# Patient Record
Sex: FEMALE | Race: WHITE | Employment: FULL TIME | URBAN - METROPOLITAN AREA
[De-identification: names, ages, dates, MRNs, and addresses within clinical notes are randomized per-mention and may not be internally consistent; named-entity substitution may affect disease eponyms.]

---

## 2017-01-06 ENCOUNTER — OFFICE VISIT (OUTPATIENT)
Dept: PERINATAL CARE | Facility: HOSPITAL | Age: 37
End: 2017-01-06
Attending: OBSTETRICS & GYNECOLOGY
Payer: COMMERCIAL

## 2017-01-06 VITALS
HEIGHT: 70 IN | SYSTOLIC BLOOD PRESSURE: 133 MMHG | BODY MASS INDEX: 30.06 KG/M2 | HEART RATE: 93 BPM | DIASTOLIC BLOOD PRESSURE: 73 MMHG | WEIGHT: 210 LBS

## 2017-01-06 DIAGNOSIS — O44.42 LOW-LYING PLACENTA WITHOUT HEMORRHAGE, SECOND TRIMESTER: ICD-10-CM

## 2017-01-06 DIAGNOSIS — O09.522 AMA (ADVANCED MATERNAL AGE) MULTIGRAVIDA 35+, SECOND TRIMESTER: Primary | ICD-10-CM

## 2017-01-06 PROBLEM — O09.529 AMA (ADVANCED MATERNAL AGE) MULTIGRAVIDA 35+: Status: ACTIVE | Noted: 2017-01-06

## 2017-01-06 PROBLEM — O09.529 AMA (ADVANCED MATERNAL AGE) MULTIGRAVIDA 35+ (HCC): Status: ACTIVE | Noted: 2017-01-06

## 2017-01-06 PROCEDURE — 99242 OFF/OP CONSLTJ NEW/EST SF 20: CPT

## 2017-01-06 PROCEDURE — 76817 TRANSVAGINAL US OBSTETRIC: CPT

## 2017-01-06 RX ORDER — CHOLECALCIFEROL (VITAMIN D3) 25 MCG
1 TABLET,CHEWABLE ORAL DAILY
COMMUNITY
End: 2018-12-27 | Stop reason: ALTCHOICE

## 2017-01-06 NOTE — PROGRESS NOTES
Outpatient Maternal-Fetal Medicine Consultation    Dear Dr. Gwendolyn Gaucher    Thank you for requesting ultrasound evaluation and maternal fetal medicine consultation on your patient Domenica Maynard.   As you are aware she is a 29year old female  with a Singl internal loss.   ____________________________________________________________________________  Dating:  LMP: 08/18/2016 EDC: 05/25/2017 GA by LMP: 20w1d  Current Scan on: 01/06/2017 EDC: 05/26/2017 GA by current scan: 20w0d  The calculation of the gestation mm.  ____________________________________________________________________________      I interpreted the results and reviewed them with the patient.     DISCUSSION  During her visit we discussed and reviewed the following issues:  ADVANCED MATERNAL AGE    B non-invasive pregnancy testing (NIPT) offers the highest detection rate (with the lowest false positive rate) for the detection of fetal aneuploidy amongst high-risk patients.   The limitations of detailed mid-trimester sonography was reviewed with the derrick physician face-to-face time was 30 minutes.

## 2017-03-03 ENCOUNTER — NURSE ONLY (OUTPATIENT)
Dept: ENDOCRINOLOGY CLINIC | Facility: CLINIC | Age: 37
End: 2017-03-03

## 2017-03-03 VITALS
SYSTOLIC BLOOD PRESSURE: 131 MMHG | HEART RATE: 102 BPM | DIASTOLIC BLOOD PRESSURE: 82 MMHG | WEIGHT: 223 LBS | HEIGHT: 70 IN | BODY MASS INDEX: 31.92 KG/M2

## 2017-03-03 DIAGNOSIS — O24.410 DIET CONTROLLED GESTATIONAL DIABETES MELLITUS (GDM) IN THIRD TRIMESTER: Primary | ICD-10-CM

## 2017-03-03 PROCEDURE — G0108 DIAB MANAGE TRN  PER INDIV: HCPCS

## 2017-03-03 RX ORDER — LANCETS 33 GAUGE
1 EACH MISCELLANEOUS 4 TIMES DAILY
Qty: 2 BOX | Refills: 1 | Status: SHIPPED | OUTPATIENT
Start: 2017-03-03 | End: 2017-05-08

## 2017-03-03 NOTE — PROGRESS NOTES
Hannahindira Ileana  :11/10/1980 was seen for Gestational Diabetes Counseling: Individual/Group    Date: 3/3/2017       Assessment:/82 mmHg  Pulse 102  Ht 70\"  Wt 223 lb  BMI 32.00 kg/m2  LMP 2016    : 2  Para: 1  JAILENE: 2017  History of Diabetes and Pregnancy: A guide to self management  BG Log Sheets    Recommendations:      1. Follow recommended meal plan. 2. Begin testing fasting glucose and 2 hour after meals   3. Bring glucose / food log to next MD office visit.    4. Encouraged act

## 2017-03-23 ENCOUNTER — NURSE ONLY (OUTPATIENT)
Dept: ENDOCRINOLOGY CLINIC | Facility: CLINIC | Age: 37
End: 2017-03-23

## 2017-03-23 VITALS
BODY MASS INDEX: 32 KG/M2 | DIASTOLIC BLOOD PRESSURE: 88 MMHG | WEIGHT: 223 LBS | HEART RATE: 104 BPM | SYSTOLIC BLOOD PRESSURE: 139 MMHG

## 2017-03-23 DIAGNOSIS — O24.410 DIET CONTROLLED GESTATIONAL DIABETES MELLITUS (GDM) IN THIRD TRIMESTER: Primary | ICD-10-CM

## 2017-03-23 PROCEDURE — G0108 DIAB MANAGE TRN  PER INDIV: HCPCS

## 2017-03-23 NOTE — PROGRESS NOTES
Met with Trevor Smalls for a 2 week Gestational follow up. She is testing her BG levels fasting and 2 hours after every meal.  With an average of 99 fasting and 87 2 hours after meals. She is following her diet and is exercising for 1 hour a day.   She does not  v

## 2017-05-08 RX ORDER — LANCETS 33 GAUGE
1 EACH MISCELLANEOUS 4 TIMES DAILY
Qty: 2 BOX | Refills: 1 | Status: SHIPPED | OUTPATIENT
Start: 2017-05-08 | End: 2018-05-08

## 2017-05-25 ENCOUNTER — TELEPHONE (OUTPATIENT)
Dept: OBGYN UNIT | Facility: HOSPITAL | Age: 37
End: 2017-05-25

## 2017-05-26 ENCOUNTER — TELEPHONE (OUTPATIENT)
Dept: OBGYN UNIT | Facility: HOSPITAL | Age: 37
End: 2017-05-26

## 2017-05-30 ENCOUNTER — HOSPITAL ENCOUNTER (INPATIENT)
Facility: HOSPITAL | Age: 37
LOS: 2 days | Discharge: HOME | End: 2017-06-01
Attending: OBSTETRICS & GYNECOLOGY | Admitting: OBSTETRICS & GYNECOLOGY
Payer: COMMERCIAL

## 2017-05-30 PROBLEM — Z34.90 NORMAL PREGNANCY (HCC): Status: ACTIVE | Noted: 2017-05-30

## 2017-05-30 PROBLEM — Z34.90 PREGNANCY: Status: ACTIVE | Noted: 2017-05-30

## 2017-05-30 PROBLEM — Z34.90 NORMAL PREGNANCY: Status: ACTIVE | Noted: 2017-05-30

## 2017-05-30 PROBLEM — Z34.90 PREGNANCY (HCC): Status: ACTIVE | Noted: 2017-05-30

## 2017-05-30 PROCEDURE — 85027 COMPLETE CBC AUTOMATED: CPT | Performed by: OBSTETRICS & GYNECOLOGY

## 2017-05-30 PROCEDURE — 86850 RBC ANTIBODY SCREEN: CPT | Performed by: OBSTETRICS & GYNECOLOGY

## 2017-05-30 PROCEDURE — 86900 BLOOD TYPING SEROLOGIC ABO: CPT | Performed by: OBSTETRICS & GYNECOLOGY

## 2017-05-30 PROCEDURE — 86901 BLOOD TYPING SEROLOGIC RH(D): CPT | Performed by: OBSTETRICS & GYNECOLOGY

## 2017-05-30 PROCEDURE — 0KQM0ZZ REPAIR PERINEUM MUSCLE, OPEN APPROACH: ICD-10-PCS | Performed by: OBSTETRICS & GYNECOLOGY

## 2017-05-30 PROCEDURE — 86780 TREPONEMA PALLIDUM: CPT | Performed by: OBSTETRICS & GYNECOLOGY

## 2017-05-30 PROCEDURE — 82962 GLUCOSE BLOOD TEST: CPT

## 2017-05-30 RX ORDER — ACETAMINOPHEN 325 MG/1
650 TABLET ORAL EVERY 4 HOURS PRN
Status: DISCONTINUED | OUTPATIENT
Start: 2017-05-30 | End: 2017-06-01

## 2017-05-30 RX ORDER — TERBUTALINE SULFATE 1 MG/ML
0.25 INJECTION, SOLUTION SUBCUTANEOUS AS NEEDED
Status: DISCONTINUED | OUTPATIENT
Start: 2017-05-30 | End: 2017-05-30 | Stop reason: HOSPADM

## 2017-05-30 RX ORDER — HYDROCODONE BITARTRATE AND ACETAMINOPHEN 5; 325 MG/1; MG/1
2 TABLET ORAL EVERY 4 HOURS PRN
Status: DISCONTINUED | OUTPATIENT
Start: 2017-05-30 | End: 2017-06-01

## 2017-05-30 RX ORDER — LABETALOL HYDROCHLORIDE 5 MG/ML
INJECTION, SOLUTION INTRAVENOUS
Status: COMPLETED
Start: 2017-05-30 | End: 2017-05-30

## 2017-05-30 RX ORDER — ZOLPIDEM TARTRATE 5 MG/1
5 TABLET ORAL NIGHTLY PRN
Status: DISCONTINUED | OUTPATIENT
Start: 2017-05-30 | End: 2017-06-01

## 2017-05-30 RX ORDER — NALBUPHINE HCL 10 MG/ML
2.5 AMPUL (ML) INJECTION
Status: DISCONTINUED | OUTPATIENT
Start: 2017-05-30 | End: 2017-06-01

## 2017-05-30 RX ORDER — SIMETHICONE 80 MG
80 TABLET,CHEWABLE ORAL 3 TIMES DAILY PRN
Status: DISCONTINUED | OUTPATIENT
Start: 2017-05-30 | End: 2017-06-01

## 2017-05-30 RX ORDER — HYDROCODONE BITARTRATE AND ACETAMINOPHEN 5; 325 MG/1; MG/1
1 TABLET ORAL EVERY 4 HOURS PRN
Status: DISCONTINUED | OUTPATIENT
Start: 2017-05-30 | End: 2017-06-01

## 2017-05-30 RX ORDER — BISACODYL 10 MG
10 SUPPOSITORY, RECTAL RECTAL ONCE AS NEEDED
Status: ACTIVE | OUTPATIENT
Start: 2017-05-30 | End: 2017-05-30

## 2017-05-30 RX ORDER — IBUPROFEN 600 MG/1
600 TABLET ORAL EVERY 6 HOURS
Status: DISCONTINUED | OUTPATIENT
Start: 2017-05-30 | End: 2017-06-01

## 2017-05-30 RX ORDER — HYDRALAZINE HYDROCHLORIDE 20 MG/ML
10 INJECTION INTRAMUSCULAR; INTRAVENOUS ONCE AS NEEDED
Status: DISCONTINUED | OUTPATIENT
Start: 2017-06-02 | End: 2017-06-01

## 2017-05-30 RX ORDER — EPHEDRINE SULFATE 50 MG/ML
5 INJECTION, SOLUTION INTRAVENOUS AS NEEDED
Status: DISCONTINUED | OUTPATIENT
Start: 2017-05-30 | End: 2017-06-01

## 2017-05-30 RX ORDER — DEXTROSE, SODIUM CHLORIDE, SODIUM LACTATE, POTASSIUM CHLORIDE, AND CALCIUM CHLORIDE 5; .6; .31; .03; .02 G/100ML; G/100ML; G/100ML; G/100ML; G/100ML
INJECTION, SOLUTION INTRAVENOUS AS NEEDED
Status: DISCONTINUED | OUTPATIENT
Start: 2017-05-30 | End: 2017-05-30 | Stop reason: HOSPADM

## 2017-05-30 RX ORDER — DOCUSATE SODIUM 100 MG/1
100 CAPSULE, LIQUID FILLED ORAL
Status: DISCONTINUED | OUTPATIENT
Start: 2017-05-30 | End: 2017-06-01

## 2017-05-30 RX ORDER — LABETALOL HYDROCHLORIDE 5 MG/ML
80 INJECTION, SOLUTION INTRAVENOUS ONCE AS NEEDED
Status: DISCONTINUED | OUTPATIENT
Start: 2017-06-01 | End: 2017-06-01

## 2017-05-30 RX ORDER — LABETALOL HYDROCHLORIDE 5 MG/ML
20 INJECTION, SOLUTION INTRAVENOUS ONCE AS NEEDED
Status: COMPLETED | OUTPATIENT
Start: 2017-05-30 | End: 2017-05-30

## 2017-05-30 RX ORDER — LABETALOL HYDROCHLORIDE 5 MG/ML
40 INJECTION, SOLUTION INTRAVENOUS ONCE AS NEEDED
Status: ACTIVE | OUTPATIENT
Start: 2017-05-31 | End: 2017-05-31

## 2017-05-30 RX ORDER — SODIUM CHLORIDE, SODIUM LACTATE, POTASSIUM CHLORIDE, CALCIUM CHLORIDE 600; 310; 30; 20 MG/100ML; MG/100ML; MG/100ML; MG/100ML
INJECTION, SOLUTION INTRAVENOUS CONTINUOUS
Status: DISCONTINUED | OUTPATIENT
Start: 2017-05-30 | End: 2017-05-30 | Stop reason: HOSPADM

## 2017-05-30 RX ORDER — IBUPROFEN 600 MG/1
600 TABLET ORAL ONCE AS NEEDED
Status: DISCONTINUED | OUTPATIENT
Start: 2017-05-30 | End: 2017-05-30 | Stop reason: HOSPADM

## 2017-05-30 NOTE — PROGRESS NOTES
Sign out received - patient comfortable   Tracing reviewed - fetal heart tones gradually creeping up since 07:00 - No decelerations or fever. Ephedrine given two hour ago after epidural at 05:30 causing decrease in BP   (BP stable now).     Suspect early c

## 2017-05-30 NOTE — PLAN OF CARE
Problem: Patient/Family Goals  Goal: Patient/Family Long Term Goal  Patient’s Long Term Goal: patient will have successful vaginal delivery    Interventions:  - See additional Care Plan goals for specific interventions   Outcome: Completed Date Met:  05/30 pain management  - Manage/alleviate anxiety  - Utilize distraction and/or relaxation techniques  - Monitor for opioid side effects  - Notify MD/LIP if interventions unsuccessful or patient reports new pain   Outcome: Completed Date Met:  05/30/17    Proble

## 2017-05-30 NOTE — L&D DELIVERY NOTE
Vaginal Delivery Note          Keyanna Garcia Patient Status:  Inpatient    11/10/1980 MRN [id number]   [facility] 1NW-A Attending Elisha Underwood MD   Hosp Day # 0 [facility] [address] condition.   Mother's Information           Raji Middleton  [[id number]]     Labor Events     steroids?:  None   Antibiotics received during labor?:  Yes   Antibiotics (enter # doses in comment):  ampicillin   Rupture date:  17  Rupture time:  0100 Spontaneous   Appearance:  Intact   Disposition:  held for future pathology          Apgars    Living status:  Yes   Apgar Scoring Key:    0 1 2    Skin color Blue or pale Acrocyanotic Completely pink    Heart rate Absent <100 bpm >100 bpm    Reflex irrita

## 2017-05-30 NOTE — PROGRESS NOTES
Patient up to bathroom with assist x 2. Voided 700. Patient transferred to mother/baby room 2213 per wheelchair in stable condition with baby and personal belongings. Accompanied by significant other and staff. Report given to mother/baby RN.

## 2017-05-30 NOTE — PROGRESS NOTES
Patient to triage 3 with complaints of contractions every 5 minutes since 0100. Patient denies vaginal bleeding. Possible ROM @ 0100. Unable to confirm d/t patient be uncomfortable with contractions. SVE completed and patient moved to room 113.

## 2017-05-30 NOTE — H&P
Mary Thompson Patient Status:  Inpatient    11/10/1980 MRN [id number]   [facility] 1NW-A Attending Carlton Rose MD   Hosp Day # 0 PCP Maxine Bhatia MD     Subjective:  Gurwinder Olson is a 29 yea and post admission procedures and expectations were discussed in detail. All questions answered, all appropriate consents will be signed at the Hospital. Admission is planned for today. Expectant management., Anticipate vaginal delivery. , Analgesia: ROMAIN

## 2017-05-30 NOTE — PROGRESS NOTES
NURSING ADMISSION NOTE      Patient admitted via wheelchair  Oriented to room. Safety precautions initiated. Bed in low position. Call light in reach. Instructed to call for assistance before ambulating for the 1st time.   RN reviewed admit packet w

## 2017-05-31 PROCEDURE — 85025 COMPLETE CBC W/AUTO DIFF WBC: CPT | Performed by: OBSTETRICS & GYNECOLOGY

## 2017-05-31 NOTE — PLAN OF CARE
POSTPARTUM    • Long Term Goal:Experiences normal postpartum course Progressing    • Optimize infant feeding at the breast; patient alternating breasts while feeding  Progressing    • Establishment of adequate milk supply with medication/procedure interrup

## 2017-06-01 VITALS
DIASTOLIC BLOOD PRESSURE: 96 MMHG | SYSTOLIC BLOOD PRESSURE: 142 MMHG | TEMPERATURE: 98 F | HEIGHT: 70 IN | RESPIRATION RATE: 17 BRPM | BODY MASS INDEX: 32.21 KG/M2 | WEIGHT: 225 LBS | HEART RATE: 76 BPM | OXYGEN SATURATION: 97 %

## 2017-06-01 RX ORDER — HYDROCODONE BITARTRATE AND ACETAMINOPHEN 5; 325 MG/1; MG/1
1 TABLET ORAL EVERY 4 HOURS PRN
Qty: 20 TABLET | Refills: 0 | Status: SHIPPED | OUTPATIENT
Start: 2017-06-01 | End: 2018-12-27 | Stop reason: ALTCHOICE

## 2017-06-01 NOTE — CONSULTS
[facility]    Patients Name: Darlene Edwards  Attending Physician: Amy Mccrary MD  CSN: [id number]    Location:  2213/2213-A  MRN: [id number]    YOB: 1980  Admission Date: 5/30/2017     Obstetric Anesthesia Pain Progress Note    Post-Op

## 2017-06-01 NOTE — PLAN OF CARE
POSTPARTUM    • Long Term Goal:Experiences normal postpartum course Completed    • Optimize infant feeding at the breast Completed    • Establishment of adequate milk supply with medication/procedure interruptions Completed    • Appropriate maternal - newb

## 2017-06-01 NOTE — PROGRESS NOTES
REVIEWED D/C TEACHING WITH PT. VERBALIZES UNDERSTANDING. ALL QUESTIONS ANSWERED. PT STATES FEELS CONFIDENT CARING FOR SELF AND  AT HOME. MOM & BABY DISCHARGED TO HOME IN STABLE CONDITION. WILL FOLLOW-UP IN OFFICE AS DIRECTED WITH PEDS AND OB.

## 2017-06-01 NOTE — PROGRESS NOTES
[facility]  Post-Partum Progress Note    John Ards Patient Status:  Inpatient    11/10/1980 MRN [id number]   [facility] 2SW-J Attending Mervin Martin MD   Hosp Day # 2 PCP Tanya Fitzpatrick MD     SUBJECTIVE:    Postpartum Day

## 2017-06-01 NOTE — DISCHARGE SUMMARY
[facility]  Discharge Summary    Nasrin Lopez Patient Status:  Inpatient    11/10/1980 MRN [id number]   [facility] 2SW-J Attending Carmen Lomeli MD   Hosp Day # 2 PCP Benjamin Chapin MD         [facility]: Estimated Date of Delivery: 5

## 2017-06-04 ENCOUNTER — TELEPHONE (OUTPATIENT)
Dept: OBGYN UNIT | Facility: HOSPITAL | Age: 37
End: 2017-06-04

## 2017-06-04 NOTE — PROGRESS NOTES
Outgoing cradle call completed. Mom reports that she and infant are doing well. No complaints of PPB or PPD. Has had pediatrician F/U visit. Has PP F/U visit scheduled. Reviewed basic infant and self care; verbalizes understanding.   Encouraged to follow-u

## 2018-03-06 NOTE — PROGRESS NOTES
[facility]  Post-Partum Progress Note    Altonida Han Patient Status:  Inpatient    11/10/1980 MRN [id number]   [facility] 2SW-J Attending Matteo Barillas MD   Hosp Day # 1 PCP Stephanie See MD     SUBJECTIVE:    Postpartum Day Svetlana Narvaez MD

## 2018-10-20 ENCOUNTER — IMMUNIZATION (OUTPATIENT)
Dept: FAMILY MEDICINE CLINIC | Facility: CLINIC | Age: 38
End: 2018-10-20
Payer: COMMERCIAL

## 2018-10-20 DIAGNOSIS — Z23 NEED FOR VACCINATION: ICD-10-CM

## 2018-10-20 PROCEDURE — 90471 IMMUNIZATION ADMIN: CPT | Performed by: NURSE PRACTITIONER

## 2018-10-20 PROCEDURE — 90686 IIV4 VACC NO PRSV 0.5 ML IM: CPT | Performed by: NURSE PRACTITIONER

## 2018-10-22 ENCOUNTER — MED REC SCAN ONLY (OUTPATIENT)
Dept: FAMILY MEDICINE CLINIC | Facility: CLINIC | Age: 38
End: 2018-10-22

## 2018-11-01 PROCEDURE — 88175 CYTOPATH C/V AUTO FLUID REDO: CPT | Performed by: OBSTETRICS & GYNECOLOGY

## 2018-11-01 PROCEDURE — 87624 HPV HI-RISK TYP POOLED RSLT: CPT | Performed by: OBSTETRICS & GYNECOLOGY

## 2018-12-27 ENCOUNTER — OFFICE VISIT (OUTPATIENT)
Dept: FAMILY MEDICINE CLINIC | Facility: CLINIC | Age: 38
End: 2018-12-27
Payer: COMMERCIAL

## 2018-12-27 VITALS
DIASTOLIC BLOOD PRESSURE: 66 MMHG | TEMPERATURE: 98 F | SYSTOLIC BLOOD PRESSURE: 122 MMHG | WEIGHT: 220 LBS | BODY MASS INDEX: 32.58 KG/M2 | RESPIRATION RATE: 16 BRPM | HEART RATE: 74 BPM | HEIGHT: 69 IN

## 2018-12-27 DIAGNOSIS — J06.9 VIRAL URI WITH COUGH: Primary | ICD-10-CM

## 2018-12-27 PROCEDURE — 99213 OFFICE O/P EST LOW 20 MIN: CPT | Performed by: FAMILY MEDICINE

## 2018-12-27 RX ORDER — BENZONATATE 200 MG/1
200 CAPSULE ORAL 3 TIMES DAILY PRN
Qty: 30 CAPSULE | Refills: 0 | Status: SHIPPED | OUTPATIENT
Start: 2018-12-27 | End: 2019-01-06

## 2018-12-27 NOTE — PROGRESS NOTES
Patient presents with:  Chest Congestion: dry cough and productive, gets worse in the the evening, no fevers, no ear pain       HPI:   Obdulio Gasca is a 41year old female who presents for upper respiratory symptoms    Mothers symptoms started over the wee exertion or rest.  CARDIOVASCULAR: denies chest pain on exertion  GI: no nausea or abdominal pain      EXAM:   /66   Pulse 74   Temp 98.4 °F (36.9 °C) (Oral)   Resp 16   Ht 69\"   Wt 220 lb   LMP 11/30/2018 (Approximate)   BMI 32.49 kg/m²   GENERAL: 35+     Low-lying placenta without hemorrhage, second trimester     Pregnancy     Normal pregnancy      (normal spontaneous vaginal delivery)      Jennifer Osorio MD  2018  11:14 AM

## 2018-12-30 ENCOUNTER — OFFICE VISIT (OUTPATIENT)
Dept: FAMILY MEDICINE CLINIC | Facility: CLINIC | Age: 38
End: 2018-12-30
Payer: COMMERCIAL

## 2018-12-30 VITALS
WEIGHT: 220 LBS | OXYGEN SATURATION: 99 % | BODY MASS INDEX: 32.58 KG/M2 | HEART RATE: 80 BPM | RESPIRATION RATE: 16 BRPM | SYSTOLIC BLOOD PRESSURE: 124 MMHG | DIASTOLIC BLOOD PRESSURE: 72 MMHG | HEIGHT: 69 IN | TEMPERATURE: 98 F

## 2018-12-30 DIAGNOSIS — H66.001 NON-RECURRENT ACUTE SUPPURATIVE OTITIS MEDIA OF RIGHT EAR WITHOUT SPONTANEOUS RUPTURE OF TYMPANIC MEMBRANE: Primary | ICD-10-CM

## 2018-12-30 DIAGNOSIS — H61.21 IMPACTED CERUMEN OF RIGHT EAR: ICD-10-CM

## 2018-12-30 PROCEDURE — 99213 OFFICE O/P EST LOW 20 MIN: CPT | Performed by: NURSE PRACTITIONER

## 2018-12-30 PROCEDURE — 69210 REMOVE IMPACTED EAR WAX UNI: CPT | Performed by: NURSE PRACTITIONER

## 2018-12-30 RX ORDER — AMOXICILLIN AND CLAVULANATE POTASSIUM 875; 125 MG/1; MG/1
1 TABLET, FILM COATED ORAL 2 TIMES DAILY
Qty: 20 TABLET | Refills: 0 | Status: SHIPPED | OUTPATIENT
Start: 2018-12-30 | End: 2019-01-09

## 2018-12-30 NOTE — PATIENT INSTRUCTIONS
1. Rest. Drink plenty of fluids. 2. Augmentin as prescribed. 3. Tylenol/Ibuprofen for pain/fevers. 4. Use humidifier at home when possible.   5. Follow up with PMD in 3-4 days for reeval. Follow up sooner or go to the emergency department immediately if Reviewed: 6/1/2016  © 0474-6191 [employer] [address] [facility] [place], [facility] [place]. All rights reserved. This information is not intended as a substitute for professional medical care.  Always follow your healthcare professional's instruct swimming plugs, and swim molds can cause the same problem when used again and again. In some cases, the cause of impacted earwax is not known.   Symptoms of impacted earwax  Excess earwax usually does not cause any symptoms, unless there is a large amount include bleeding or severe ear pain. Date Last Reviewed: 5/1/2017  © 0046-9053 [employer]. [address] [facility] [place], [facility] [place]. All rights reserved. This information is not intended as a substitute for professional medical care.  Cherelle Crum

## 2018-12-30 NOTE — PROGRESS NOTES
CHIEF COMPLAINT:   Patient presents with:  Ear Pain      HPI:   Laure Newman is a non-toxic, well appearing 41year old female who presents today with complaints of right ear fullness. Notes it has been present for 3-4 days. Denies fevers.  Notes muffled SKIN: no rashes,no suspicious lesions  HEAD: atraumatic, normocephalic  EYES: conjunctiva clear, EOM intact  EARS: Right ear: Initial assessment shows ear canal completely occluded with cerumen.   Unable to visualize TM Pt ear irrigated and curette used to Treatment options discussed with patient and explained in detail. Will start augmentin today along with symptomatic careThe risks, benefits and potential side effects of the medications were reviewed. Alternatives were discussed.  Advised probiotic while ta Follow up with your healthcare provider, or as advised, in 2 weeks if all symptoms have not gotten better, or if hearing doesn't go back to normal within 1 month.   When to seek medical advice  Call your healthcare provider right away if any of these occur: · Bony blockage in the ear (osteoma or exostoses)  · Infections, such as  infection of the outer ear (external otitis)  · Skin disease, such as eczema  · Autoimmune diseases, such as lupus  · A narrowed ear canal from birth, chronic inflammation, or injury Healthcare providers do not advise use of ear candles or ear vacuum kits. These methods are not shown to work and may cause problems.    Preventing impacted earwax  You may not be able to prevent impacted earwax if you have a health condition that causes it

## 2018-12-30 NOTE — PROCEDURES
Cerumen Impaction    Reason(s) for procedure:  Non-recurrent acute suppurative otitis media of right ear without spontaneous rupture of tympanic membrane  (primary encounter diagnosis)  Impacted cerumen of right ear    Ear Exam:  Canal:  Right ear canal co

## 2019-11-11 ENCOUNTER — IMMUNIZATION (OUTPATIENT)
Dept: FAMILY MEDICINE CLINIC | Facility: CLINIC | Age: 39
End: 2019-11-11
Payer: COMMERCIAL

## 2019-11-11 DIAGNOSIS — Z23 NEED FOR VACCINATION: ICD-10-CM

## 2019-11-11 PROCEDURE — 90471 IMMUNIZATION ADMIN: CPT | Performed by: NURSE PRACTITIONER

## 2019-11-11 PROCEDURE — 90686 IIV4 VACC NO PRSV 0.5 ML IM: CPT | Performed by: NURSE PRACTITIONER

## 2020-10-19 NOTE — PROGRESS NOTES
Dr Barb Gusman notified of blood pressures of 154/83 and 148/73. No new orders. In the next few weeks you may receive a [employer] survey regarding your most recent clinic visit with us.  Please take a few moments to accurately evaluate your visit. We strive to provide you with the best medical care. Your feedback will assist us in achieving this. Again, thank you for your time and we look forward to your next visit.         If we need to contact you regarding any test results, we will make 2 attempts to reach you at the number you have listed during your office visit today. If we are unable to reach you, a letter with your results and any further instructions will be mailed to you home.      [facility]  [phone] [address] [place] [address]

## 2022-03-08 NOTE — PLAN OF CARE
If you have any questions during regular office hours, please contact the nurse line at [phone]  If acute urgent concerns arise after hours, you can call [phone] and ask to speak to the pediatric gastroenterologist on call.  If you have clinic scheduling needs, please call the Call Center at [phone].  If you need to schedule Radiology tests, call [phone].  Outside lab and imaging results should be faxed to [phone]. If you go to a lab outside of [place] we will not automatically get those results. You will need to ask them to send them to us.  My Chart messages are for routine communication and questions and are usually answered within 48-72 hours. If you have an urgent concern or require sooner response, please call us.  Main  Services:  [phone]  ? Hmong/Pashto/Ren: [phone]  ? [place]: [phone]  ? Citizen of [place]: [phone]  ?    POSTPARTUM    • Long Term Goal:Experiences normal postpartum course Progressing    • Optimize infant feeding at the breast Progressing    • Establishment of adequate milk supply with medication/procedure interruptions Progressing    • Experiences normal br

## 2022-03-11 ENCOUNTER — APPOINTMENT (OUTPATIENT)
Dept: GENERAL RADIOLOGY | Age: 42
End: 2022-03-11
Attending: PHYSICIAN ASSISTANT
Payer: COMMERCIAL

## 2022-03-11 ENCOUNTER — TELEPHONE (OUTPATIENT)
Dept: ORTHOPEDICS CLINIC | Facility: CLINIC | Age: 42
End: 2022-03-11

## 2022-03-11 ENCOUNTER — HOSPITAL ENCOUNTER (EMERGENCY)
Age: 42
Discharge: HOME | End: 2022-03-11
Attending: EMERGENCY MEDICINE
Payer: COMMERCIAL

## 2022-03-11 VITALS
HEART RATE: 84 BPM | TEMPERATURE: 98 F | BODY MASS INDEX: 32.93 KG/M2 | HEIGHT: 70 IN | WEIGHT: 230 LBS | DIASTOLIC BLOOD PRESSURE: 104 MMHG | OXYGEN SATURATION: 100 % | SYSTOLIC BLOOD PRESSURE: 157 MMHG | RESPIRATION RATE: 16 BRPM

## 2022-03-11 DIAGNOSIS — S93.401A MODERATE RIGHT ANKLE SPRAIN, INITIAL ENCOUNTER: Primary | ICD-10-CM

## 2022-03-11 PROCEDURE — 99283 EMERGENCY DEPT VISIT LOW MDM: CPT

## 2022-03-11 PROCEDURE — 73610 X-RAY EXAM OF ANKLE: CPT | Performed by: PHYSICIAN ASSISTANT

## 2022-03-11 NOTE — TELEPHONE ENCOUNTER
Patient has an appointment scheduled but would like something sooner. She is scheduled on March 28. Please advise patient if we can get her in any sooner.

## 2022-03-11 NOTE — ED INITIAL ASSESSMENT (HPI)
Pt states she fell and twisted her right ankle on Sunday, Pt has been I walking boot with crutches.  Pt sates she still has pain and swelling

## 2022-03-11 NOTE — TELEPHONE ENCOUNTER
Patient had a sprained right  ankle and would like to get in as soon as possible.  Please advise patient i

## 2022-03-11 NOTE — TELEPHONE ENCOUNTER
Spoke with patient and scheduled her to see Dr. Mary Wyman on Monday, 3/14. Appt set and date/time/location confirmed with patient. Pt verbalized understanding. No further questions at this time. Xrays in epic.

## 2022-03-14 ENCOUNTER — OFFICE VISIT (OUTPATIENT)
Dept: ORTHOPEDICS CLINIC | Facility: CLINIC | Age: 42
End: 2022-03-14
Payer: COMMERCIAL

## 2022-03-14 DIAGNOSIS — S93.491A SPRAIN OF ANTERIOR TALOFIBULAR LIGAMENT OF RIGHT ANKLE, INITIAL ENCOUNTER: Primary | ICD-10-CM

## 2022-03-14 PROCEDURE — 99204 OFFICE O/P NEW MOD 45 MIN: CPT | Performed by: ORTHOPAEDIC SURGERY

## 2023-01-03 ENCOUNTER — OFFICE VISIT (OUTPATIENT)
Dept: FAMILY MEDICINE CLINIC | Facility: CLINIC | Age: 43
End: 2023-01-03
Payer: COMMERCIAL

## 2023-01-03 VITALS
WEIGHT: 220 LBS | HEART RATE: 84 BPM | DIASTOLIC BLOOD PRESSURE: 88 MMHG | HEIGHT: 70 IN | TEMPERATURE: 98 F | OXYGEN SATURATION: 98 % | RESPIRATION RATE: 18 BRPM | SYSTOLIC BLOOD PRESSURE: 128 MMHG | BODY MASS INDEX: 31.5 KG/M2

## 2023-01-03 DIAGNOSIS — J02.9 SORE THROAT: ICD-10-CM

## 2023-01-03 DIAGNOSIS — J02.0 STREPTOCOCCAL PHARYNGITIS: Primary | ICD-10-CM

## 2023-01-03 LAB
CONTROL LINE PRESENT WITH A CLEAR BACKGROUND (YES/NO): YES YES/NO
STREP GRP A CUL-SCR: POSITIVE

## 2023-01-03 PROCEDURE — 3074F SYST BP LT 130 MM HG: CPT | Performed by: FAMILY MEDICINE

## 2023-01-03 PROCEDURE — 3008F BODY MASS INDEX DOCD: CPT | Performed by: FAMILY MEDICINE

## 2023-01-03 PROCEDURE — 87880 STREP A ASSAY W/OPTIC: CPT | Performed by: FAMILY MEDICINE

## 2023-01-03 PROCEDURE — 99213 OFFICE O/P EST LOW 20 MIN: CPT | Performed by: FAMILY MEDICINE

## 2023-01-03 PROCEDURE — 3079F DIAST BP 80-89 MM HG: CPT | Performed by: FAMILY MEDICINE

## 2023-01-03 RX ORDER — AMOXICILLIN 500 MG/1
500 CAPSULE ORAL 2 TIMES DAILY
Qty: 20 CAPSULE | Refills: 0 | Status: SHIPPED | OUTPATIENT
Start: 2023-01-03 | End: 2023-01-13

## 2024-08-23 RX ORDER — ETONOGESTREL AND ETHINYL ESTRADIOL VAGINAL RING .015; .12 MG/D; MG/D
RING VAGINAL
Qty: 1 EACH | Refills: 11 | Status: CANCELLED
Start: 2024-08-23

## 2024-08-26 ENCOUNTER — OFFICE VISIT (OUTPATIENT)
Facility: CLINIC | Age: 44
End: 2024-08-26
Payer: COMMERCIAL

## 2024-08-26 VITALS
DIASTOLIC BLOOD PRESSURE: 86 MMHG | HEIGHT: 70 IN | WEIGHT: 233 LBS | BODY MASS INDEX: 33.36 KG/M2 | SYSTOLIC BLOOD PRESSURE: 138 MMHG

## 2024-08-26 DIAGNOSIS — Z00.00 ANNUAL PHYSICAL EXAM: ICD-10-CM

## 2024-08-26 DIAGNOSIS — N39.3 SUI (STRESS URINARY INCONTINENCE, FEMALE): ICD-10-CM

## 2024-08-26 DIAGNOSIS — Z13.220 SCREENING FOR HYPERLIPIDEMIA: ICD-10-CM

## 2024-08-26 DIAGNOSIS — Z12.4 SCREENING FOR MALIGNANT NEOPLASM OF CERVIX: ICD-10-CM

## 2024-08-26 DIAGNOSIS — N36.41 URETHRAL HYPERMOBILITY: ICD-10-CM

## 2024-08-26 DIAGNOSIS — N92.0 MENORRHAGIA WITH REGULAR CYCLE: ICD-10-CM

## 2024-08-26 DIAGNOSIS — Z01.419 ENCOUNTER FOR WELL WOMAN EXAM WITH ROUTINE GYNECOLOGICAL EXAM: Primary | ICD-10-CM

## 2024-08-26 DIAGNOSIS — E66.9 OBESITY (BMI 30.0-34.9): ICD-10-CM

## 2024-08-26 DIAGNOSIS — Z12.31 SCREENING MAMMOGRAM FOR BREAST CANCER: ICD-10-CM

## 2024-08-26 DIAGNOSIS — Z80.41 FAMILY HISTORY OF OVARIAN CANCER: ICD-10-CM

## 2024-08-26 PROBLEM — Z34.90 PREGNANCY (HCC): Status: RESOLVED | Noted: 2017-05-30 | Resolved: 2024-08-26

## 2024-08-26 PROBLEM — O09.529 AMA (ADVANCED MATERNAL AGE) MULTIGRAVIDA 35+ (HCC): Status: RESOLVED | Noted: 2017-01-06 | Resolved: 2024-08-26

## 2024-08-26 PROBLEM — E66.811 OBESITY (BMI 30.0-34.9): Status: ACTIVE | Noted: 2024-08-26

## 2024-08-26 PROBLEM — O44.42: Status: RESOLVED | Noted: 2017-01-06 | Resolved: 2024-08-26

## 2024-08-26 PROBLEM — Z34.90 NORMAL PREGNANCY (HCC): Status: RESOLVED | Noted: 2017-05-30 | Resolved: 2024-08-26

## 2024-08-26 LAB
APPEARANCE: CLEAR
BILIRUBIN: NEGATIVE
GLUCOSE (URINE DIPSTICK): NEGATIVE MG/DL
KETONES (URINE DIPSTICK): NEGATIVE MG/DL
LEUKOCYTES: NEGATIVE
MULTISTIX LOT#: NORMAL NUMERIC
NITRITE, URINE: NEGATIVE
OCCULT BLOOD: NEGATIVE
PH, URINE: 5.5 (ref 4.5–8)
PROTEIN (URINE DIPSTICK): NEGATIVE MG/DL
SPECIFIC GRAVITY: 1.03 (ref 1–1.03)
URINE-COLOR: YELLOW
UROBILINOGEN,SEMI-QN: 1 MG/DL (ref 0–1.9)

## 2024-08-26 PROCEDURE — 87624 HPV HI-RISK TYP POOLED RSLT: CPT | Performed by: OBSTETRICS & GYNECOLOGY

## 2024-08-26 PROCEDURE — 87086 URINE CULTURE/COLONY COUNT: CPT | Performed by: OBSTETRICS & GYNECOLOGY

## 2024-08-26 NOTE — PROGRESS NOTES
GYN H&P     Genetic questionnaire reviewed with the patient and she will be referred for genetic counseling if the questionnaire had any positive results.    The [facility] intake form was also reviewed regarding contraception, menstrual periods, urinary health, and vaginal / sexual health    2024  1:17 PM    Chief Complaint   Patient presents with    Annual       HPI: Va is a 43 year old  Patient's last menstrual period was 2024.  (contraception: Vasectomy) here for her annual gyn exam.     She has no complaints.   Menses are regular. Denies any pelvic or breast complaints.   Satisfied with current contraception.     Hx of H-IUD use    F. Hx of ovarian cancer    Hx of very large babies - and KYMBERLY    Menses heavier - 5-7 days, 2 - 3 days heavy changing every 2 - 3 hours    Previous encounters and chart reviewed.   2018  4:15 PM     Patient presents with:  Physical: C/O DARK MOLE RIGHT LABIA -RAISED & GROWING X 4 MONTHS.        HPI: Va is a 37 year old  Patient's last menstrual period was 2018.  (contraception: vasectomy/OCs) here for her annual gyn exam.      She has complaint of a vulvar mole that has increased in size and darkened. Menses are regular. Denies  or breast complaints.  Is not satisfied with current contraception.   Does note significant family hx of ovarian Ca.  OB History    Para Term  AB Living   3 2 2 0 1 2   SAB IAB Ectopic Multiple Live Births   1 0 0   2      # Outcome Date GA Lbr Justice/2nd Weight Sex Type Anes PTL Lv   3 Term 17 40w5d 06:48 / 01:59 9 lb 10.9 oz (4.39 kg) M NORMAL SPONT EPI  CHRISTIAN      Complications: Variable decelerations   2 Term 10/30/10 39w0d  9 lb 5 oz (4.224 kg) M Vag-Spont   CHRISTIAN   1 2009     SAB          GYN hx:   Menarche: 15  Period Cycle (Days): 28 days  Period Duration (Days): 6 days  Period Flow: mild  Use of Birth Control (if yes, specify type): Vasectomy  Hx Prior Abnormal Pap: No  Pap Date:  11/01/18  Pap Result Notes: neg,neg 11/2018. neg/neg  (01/2012 NEG, 12/10/2010 neg, 11/12/2009 neg )  Follow Up Recommendation: today      Past Medical History:    Gestational diabetes (HCC)    diet controlled     Past Surgical History:   Procedure Laterality Date    D & c  11/2009    MAB    Other surgical history  12/29/2010    mirena iud inserted    Other surgical history  08/102015    mirena iud removed     No Known Allergies  No current outpatient medications on file prior to visit.     No current facility-administered medications on file prior to visit.     Family History   Problem Relation Age of Onset    Ovarian Cancer Maternal Grandmother 65        APPROX AGE    Ovarian Cancer Other 50        LATE 50'S     Heart Disorder Maternal Grandfather      Social History     Socioeconomic History    Marital status:      Spouse name: Not on file    Number of children: Not on file    Years of education: Not on file    Highest education level: Not on file   Occupational History    Not on file   Tobacco Use    Smoking status: Never     Passive exposure: Never    Smokeless tobacco: Never   Vaping Use    Vaping status: Never Used   Substance and Sexual Activity    Alcohol use: Yes     Alcohol/week: 1.0 standard drink of alcohol     Types: 1 Glasses of wine per week    Drug use: No    Sexual activity: Yes     Partners: Male     Birth control/protection: Vasectomy   Other Topics Concern    Not on file   Social History Narrative    Not on file     Social Determinants of Health     Financial Resource Strain: Not on file   Food Insecurity: Not on file   Transportation Needs: Not on file   Physical Activity: Not on file   Stress: Not on file   Social Connections: Not on file   Housing Stability: Not on file       ROS:     Review of Systems:  General: denies fevers, chills, fatigue and malaise.   Eyes: no visual changes, denies headaches  ENT: no complaints, denies earaches, runny nose, epistaxis, throat pain or sore  throat  Respiratory: denies SOB, dyspnea, cough or wheezing  Cardiovascular: denies chest pain, palpitations, exercise intolerance   GI: denies abdominal pain, diarrhea, constipation  : no complaints, denies dysuria, increased urinary frequency. KYMBERLY see HPI   Menses regular, no dysmenorrhea, no menorrhagia, no dyspareunia   Hematological/lymphatic: denies history of excessive bleeding or bruising, denies dizziness, lightheadedness.   Breast: denies rashes, skin changes, pain, lumps or discharge   Psychiatric: denies depression, changes in sleep patterns, anxiety  Endocrine: denies hot or cold intolerance, mood changes   Neurological: denies changes in sight, smell, hearing or taste. Denies seizures or tremors  Immunological: denies allergies, denies anaphylaxis, or swollen lymph nodes  Musculoskeletal: denies joint pain, morning stiffness, decreased range of motion         O /86   Ht 70\"   Wt 233 lb (105.7 kg)   LMP 08/01/2024   BMI 33.43 kg/m²         Wt Readings from Last 6 Encounters:   08/26/24 233 lb (105.7 kg)   01/03/23 220 lb (99.8 kg)   03/11/22 230 lb (104.3 kg)   12/30/18 220 lb (99.8 kg)   12/27/18 220 lb (99.8 kg)   11/01/18 214 lb (97.1 kg)     Exam:   GENERAL: well developed, well nourished, in no apparent distress, oriented.  SKIN: no rashes, no suspicious lesions  HEENT: normal  NECK: supple; no thyromegaly, no adenopathy  LUNGS: clear to auscultation  CARDIOVASCULAR: normal S1, S2, RRR  BREASTS: soft, nontender, no palpable masses or nodes, no nipple discharge, no skin changes, no axillary adenopathy  ABDOMEN: no scars,  soft, non distended; non tender, no masses  PELVIC: External Genitalia: Normal appearing, no lesions.    Vagina: normal pink mucosa, no lesions, normal clear discharge.    First degree  anterior or posterior hernias with urethral laxity    Cervix: multiparous, no lesions , No CMT     Uterus: very very well supported AVAF, mobile, non tender, normal size    Adnexa: non  tender, no masses, normal size    Rectal: deferred  EXTREMITIES:  non tender without edema  Component  Ref Range & Units  1:56 PM   Glucose Urine  Negative mg/dL Negative   Bilirubin Urine  Negative Negative   Ketones, UA  Negative - Trace mg/dL Negative   Spec Gravity  1.005 - 1.030 1.030   Blood Urine  Negative Negative   PH Urine  5.0 - 8.0 5.5   Protein Urine  Negative - Trace mg/dL Negative   Urobilinogen Urine  0.2 - 1.0 mg/dL 1.0   Nitrite Urine  Negative Negative   Leukocyte Esterase Urine  Negative Negative   APPEARANCE  Clear Clear   Color Urine  Yellow Yellow   Multistix Lot#  Numeric [id number]   Multistix Expiration Date  Date 12/31/2024         A/P: Patient is 43 year old female with no complaints. Here for well woman exam.     Patient counseled on:    Diet/exercise.      Self Breast Exams     Safe sex practices / and living environment     Vaccines:  Annual Flu, Tdap +/- Gardasil  recommended (up to 45 yrs).      Pneumococcal at 65 yrs old, Shingles at 60 yrs old          Pap: done  GC/Chlamydia:  na  Mammogram:  ordered  Dexa: na  Colonoscopy / Cologuard:   na  Lipid / Cholesterol:             Meds This Visit:    Requested Prescriptions      No prescriptions requested or ordered in this encounter       1. Encounter for well woman exam with routine gynecological exam    2. Screening for malignant neoplasm of cervix  - ThinPrep PAP with HPV Reflex Request B; Future    3. Screening mammogram for breast cancer  - [facility] ELISSA 2D+3D SCREENING BILAT (CPT=77067/31494); Future    4. Annual physical exam    5. Family history of ovarian cancer    6. Obesity (BMI 30.0-34.9)    7. KYMBERLY (stress urinary incontinence, female) - minor - doesn't wear a pad - knows how to do Kegels       Discussed pelvic floor P T and TVT sling.  - Urine Dip in office [92920]    8. Menorrhagia with regular cycle  - Urine Dip in office [[id number]]  - CBC With Differential With Platelet; Future  - TSH W Reflex To Free T4; Future    9. Screening for  hyperlipidemia  - Fasting Glucose; Future  - LIPID PANEL W LDL/HDL RATIO; Future    10. Urethral hypermobility    UA and C&S  Incontinence Questionnaire - consistent with KYMBERLY -   Pelvic floor PT discussed    Ultrasound and EMbx for menorrhagia along with CBC and TSH  Wt reduction helps  F hx of Ov. CA - discussed salpingectomy benefits but see genetic counselor first.     Wt reduction, offered Dr. Santos services, going to PCP who also specializes in wt reduction.     Return in about 2 weeks (around 9/9/2024) for ultrasound, Embx.    Manjinder Galloway MD   8/26/2024  1:17 PM       This note was created by Dragon voice recognition. Errors in content may be related to improper recognition by the system; efforts to review and correct have been done but errors may still exist. Please contact me with any questions.

## 2024-08-27 ENCOUNTER — HOSPITAL ENCOUNTER (OUTPATIENT)
Dept: MAMMOGRAPHY | Age: 44
Discharge: HOME | End: 2024-08-27
Attending: OBSTETRICS & GYNECOLOGY
Payer: COMMERCIAL

## 2024-08-27 DIAGNOSIS — Z12.31 SCREENING MAMMOGRAM FOR BREAST CANCER: ICD-10-CM

## 2024-08-27 PROCEDURE — 77063 BREAST TOMOSYNTHESIS BI: CPT | Performed by: OBSTETRICS & GYNECOLOGY

## 2024-08-27 PROCEDURE — 77067 SCR MAMMO BI INCL CAD: CPT | Performed by: OBSTETRICS & GYNECOLOGY

## 2024-08-30 LAB
.: NORMAL
.: NORMAL

## 2024-09-03 ENCOUNTER — LAB ENCOUNTER (OUTPATIENT)
Dept: LAB | Age: 44
End: 2024-09-03
Attending: OBSTETRICS & GYNECOLOGY
Payer: COMMERCIAL

## 2024-09-03 DIAGNOSIS — Z13.220 SCREENING FOR HYPERLIPIDEMIA: Primary | ICD-10-CM

## 2024-09-03 DIAGNOSIS — N92.0 MENORRHAGIA WITH REGULAR CYCLE: ICD-10-CM

## 2024-09-03 LAB
BASOPHILS # BLD AUTO: 0.04 X10(3) UL (ref 0–0.2)
BASOPHILS NFR BLD AUTO: 0.8 %
CHOLEST SERPL-MCNC: 187 MG/DL (ref ?–200)
EOSINOPHIL # BLD AUTO: 0.11 X10(3) UL (ref 0–0.7)
EOSINOPHIL NFR BLD AUTO: 2.1 %
ERYTHROCYTE [DISTWIDTH] IN BLOOD BY AUTOMATED COUNT: 13.1 %
FASTING PATIENT GLUCOSE ANSWER: YES
FASTING PATIENT LIPID ANSWER: YES
GLUCOSE BLD-MCNC: 111 MG/DL (ref 70–99)
HCT VFR BLD AUTO: 42.6 %
HDLC SERPL-MCNC: 46 MG/DL (ref 40–59)
HGB BLD-MCNC: 14 G/DL
HPV E6+E7 MRNA CVX QL NAA+PROBE: NEGATIVE
IMM GRANULOCYTES # BLD AUTO: 0.01 X10(3) UL (ref 0–1)
IMM GRANULOCYTES NFR BLD: 0.2 %
LDLC SERPL CALC-MCNC: 122 MG/DL (ref ?–100)
LYMPHOCYTES # BLD AUTO: 1.91 X10(3) UL (ref 1–4)
LYMPHOCYTES NFR BLD AUTO: 36.2 %
MCH RBC QN AUTO: 28.7 PG (ref 26–34)
MCHC RBC AUTO-ENTMCNC: 32.9 G/DL (ref 31–37)
MCV RBC AUTO: 87.3 FL
MONOCYTES # BLD AUTO: 0.43 X10(3) UL (ref 0.1–1)
MONOCYTES NFR BLD AUTO: 8.2 %
NEUTROPHILS # BLD AUTO: 2.77 X10 (3) UL (ref 1.5–7.7)
NEUTROPHILS # BLD AUTO: 2.77 X10(3) UL (ref 1.5–7.7)
NEUTROPHILS NFR BLD AUTO: 52.5 %
NONHDLC SERPL-MCNC: 141 MG/DL (ref ?–130)
PLATELET # BLD AUTO: 267 10(3)UL (ref 150–450)
RBC # BLD AUTO: 4.88 X10(6)UL
TRIGL SERPL-MCNC: 107 MG/DL (ref 30–149)
TSI SER-ACNC: 2.98 MIU/ML (ref 0.55–4.78)
VLDLC SERPL CALC-MCNC: 19 MG/DL (ref 0–30)
WBC # BLD AUTO: 5.3 X10(3) UL (ref 4–11)

## 2024-09-03 PROCEDURE — 85025 COMPLETE CBC W/AUTO DIFF WBC: CPT | Performed by: OBSTETRICS & GYNECOLOGY

## 2024-09-03 PROCEDURE — 84443 ASSAY THYROID STIM HORMONE: CPT | Performed by: OBSTETRICS & GYNECOLOGY

## 2024-09-03 PROCEDURE — 82947 ASSAY GLUCOSE BLOOD QUANT: CPT | Performed by: OBSTETRICS & GYNECOLOGY

## 2024-09-03 PROCEDURE — 80061 LIPID PANEL: CPT | Performed by: OBSTETRICS & GYNECOLOGY

## 2024-09-10 ENCOUNTER — MED REC SCAN ONLY (OUTPATIENT)
Facility: CLINIC | Age: 44
End: 2024-09-10

## 2024-10-29 ENCOUNTER — OFFICE VISIT (OUTPATIENT)
Dept: INTERNAL MEDICINE CLINIC | Facility: CLINIC | Age: 44
End: 2024-10-29
Payer: COMMERCIAL

## 2024-10-29 ENCOUNTER — LAB ENCOUNTER (OUTPATIENT)
Dept: LAB | Age: 44
End: 2024-10-29
Attending: INTERNAL MEDICINE
Payer: COMMERCIAL

## 2024-10-29 VITALS
RESPIRATION RATE: 18 BRPM | HEART RATE: 84 BPM | DIASTOLIC BLOOD PRESSURE: 74 MMHG | BODY MASS INDEX: 34.07 KG/M2 | SYSTOLIC BLOOD PRESSURE: 128 MMHG | HEIGHT: 69 IN | WEIGHT: 230 LBS | OXYGEN SATURATION: 99 %

## 2024-10-29 DIAGNOSIS — E78.5 DYSLIPIDEMIA: ICD-10-CM

## 2024-10-29 DIAGNOSIS — R73.01 IFG (IMPAIRED FASTING GLUCOSE): ICD-10-CM

## 2024-10-29 DIAGNOSIS — E66.811 OBESITY (BMI 30.0-34.9): ICD-10-CM

## 2024-10-29 DIAGNOSIS — Z51.81 THERAPEUTIC DRUG MONITORING: ICD-10-CM

## 2024-10-29 DIAGNOSIS — E66.811 OBESITY (BMI 30.0-34.9): Primary | ICD-10-CM

## 2024-10-29 DIAGNOSIS — Z86.32 HISTORY OF GESTATIONAL DIABETES: ICD-10-CM

## 2024-10-29 LAB
EST. AVERAGE GLUCOSE BLD GHB EST-MCNC: 111 MG/DL (ref 68–126)
FOLATE SERPL-MCNC: 15.1 NG/ML (ref 5.4–?)
HBA1C MFR BLD: 5.5 % (ref ?–5.7)
VIT B12 SERPL-MCNC: 536 PG/ML (ref 211–911)
VIT D+METAB SERPL-MCNC: 27.5 NG/ML (ref 30–100)

## 2024-10-29 PROCEDURE — 83036 HEMOGLOBIN GLYCOSYLATED A1C: CPT | Performed by: INTERNAL MEDICINE

## 2024-10-29 PROCEDURE — 3008F BODY MASS INDEX DOCD: CPT | Performed by: INTERNAL MEDICINE

## 2024-10-29 PROCEDURE — 3074F SYST BP LT 130 MM HG: CPT | Performed by: INTERNAL MEDICINE

## 2024-10-29 PROCEDURE — 82607 VITAMIN B-12: CPT | Performed by: INTERNAL MEDICINE

## 2024-10-29 PROCEDURE — 99204 OFFICE O/P NEW MOD 45 MIN: CPT | Performed by: INTERNAL MEDICINE

## 2024-10-29 PROCEDURE — 82746 ASSAY OF FOLIC ACID SERUM: CPT | Performed by: INTERNAL MEDICINE

## 2024-10-29 PROCEDURE — 82306 VITAMIN D 25 HYDROXY: CPT | Performed by: INTERNAL MEDICINE

## 2024-10-29 PROCEDURE — 3078F DIAST BP <80 MM HG: CPT | Performed by: INTERNAL MEDICINE

## 2024-10-29 NOTE — PROGRESS NOTES
HISTORY OF PRESENT ILLNESS  Chief Complaint   Patient presents with    Weight Problem     Self ref, no prior WL management,possible open to meds       Va Woods is a 43 year old female new to our office today for initiation of medical weight loss program.  Patient presents today with c/o excess weight.       Reason/goal for weight loss: ***    Previous weight loss efforts in the past/medication: ***    Interest in Bariatric surgery: ***    Barriers to weight loss: ***    Wt Readings from Last 6 Encounters:   10/29/24 230 lb (104.3 kg)   08/26/24 233 lb (105.7 kg)   01/03/23 220 lb (99.8 kg)   03/11/22 230 lb (104.3 kg)   12/30/18 220 lb (99.8 kg)   12/27/18 220 lb (99.8 kg)              [facility] Initial Intake    General Information  Patient stated 6 month goal: To feel better and be healthier   Patient stated 1 year goal: To feel better and be healthier. A “normal”   BMI range would be ideal.   Patient stated readiness to make a Lifestyle Change (0 = no desire; 10 =   extremely motivated): 8 Patient stated willingness to take medication(s)   for weight loss (0 = no interest; 10 = ready to start): 7 Patient stated   level of interest in bariatric surgery (0 = not interested; 10 = very   interested): 1   Patient agreement to achieve  weight loss: Show up for scheduled follow-up   appointments, timely complete tests as discussed and ordered (labs,   cardiac testing, sleep study and/or imaging), work toward goal of   exercising 30 minutes 5 days, 150 minutes/week, make an appointment with   Eric Elizabeth dietician to assist in making dietary changes, take   medication as prescribed and contact clinic if any questions or concerns   regarding medication, reach the goal of losing 5-10% of total body weight   within 6 months of consultation, ask for clarification, help or support   when needed and give myself credit for my accomplishments and patience   during this process.: I agree  Previous Weight Loss  Referral source  [facility]: Intranet/Web Search  Patient stated previous weight loss history: No medications. Had success   on a low glycemic plan in 2017 just didnt stick with it long term  Patient stated eating behaviors/patterns that have been barriers to weight   loss success in the past: Fast food, Pepsi, emotional or boredom eating,   junk food out of convenience and habit  24 Hour Food Journal  Breakfast: This is my worst time of day when i find myself grabbing fast   food breakfast, especially on days when i know i wont hahe time to stop   for lunch. Lunch: Pack lunch for work, often a sandwhich or salad. Lunch   generally tends to be my healthiest meal of the day. I dont always have   time to eat at work.   Dinner: Home cooked meals (wide nutritional range and portion sizes way   too big), eat out about twice per week Snacks: Fruit, veg, nuts, and too   much processed junk (chips, ice cream, popcorn, fast food)   Fluids: Mostly water but i do love milk and i drink 1-2 pepsis per day   (have switched mostly to sugar free but still consume regular)    Lifestyle   Patient stated use of tobacco: No Patient stated # of alcoholic beverages   per week: 1   Patient stated supplements taken on a regular basis include: None   Exercise   Patient stated exercises # days/week: 2 Patient stated perceived level of   exertion: 3 Patient stated average level of stress: 7   Patient stated activities: Walking, yard work   Patient stated coping strategies: EATING!   Sleep   Patient stated # hours uninterrupted sleep: 8 Patient stated # times   awakened: 2 Patient stated feels restful: No   Patient stated snores: No Patient stated has sleep apnea: No Patient   stated uses sleep device: None                Breakfast Lunch Dinner Snacks Fluids            Social hx and lifestyle reviewed:    Work: ***  Marital status: ***  Support: ***  Tobacco use: ***  ETOH use: ***  Supplements: ***  Exercise: ***  Stress level:  ***/10  Sleep hours and integrity: ***    MEDICAL HISTORY  PMH reviewed:   Cardiac disorders:negative  ***  Depression/anxiety: negative ***  Glaucoma: negative ***  Kidney stones: negative ***  Eating disorder: negative ***  Migraines: negative ***  Seizures: negative ***  Joint-related conditions:negative ***  Liver disease: negative ***  Renal disease: negative ***  Diabetes: negative***  Thyroid disease: negative ***  Constipation: negative***  DVT: negative ***   Family or personal history of Pancreatic issues / Medullary Thyroid Cancer: negative  ***  History of bariatric surgery: negative ***    FMH reviewed: obesity in parent/s or sibling: YES     REVIEW OF SYSTEMS  GENERAL: feels well otherwise,   NECK: denies thickening   LUNGS: denies shortness of breath with exertion, no apnea   CARDIOVASCULAR: denies chest pain on exertion , denies palpitations or pedal edema  GI: denies abdominal pain.  No N/V/D/C  MUSCULOSKELETAL: denies joint pains   NEURO: denies headaches   PSYCH: denies change in behavior or mood, denies feeling sad or depressed     EXAM  /74   Pulse 84   Resp 18   Ht 5' 9\" (1.753 m)   Wt 230 lb (104.3 kg)   LMP 08/01/2024   SpO2 99%   BMI 33.97 kg/m² ,              GENERAL: well developed, well nourished, in no apparent distress  SKIN: warm, pink, dry without rashes to exposed area   EYES: conjunctiva pink, sclera non icteric, PERRL  HEENT: atraumatic, normocephalic, O/p: Mallampati score- ***  NECK: supple, non tender, no adenopathy, no thyromegaly,   LUNGS: CTA in all fields, breathing non labored  CARDIO: RRR without murmur, normal S1 and S2 without clicks or gallops, no pedal edema.GI: rotund, no masses, HSM or tenderness  MUSCULOSKELETAL: grossly intact   NEURO: Oriented times three, full ROM of bilateral UE/LE  PSYCH: pleasant, cooperative, normal mood and affect,     Lab Results   Component Value Date    WBC 5.3 09/03/2024    RBC 4.88 09/03/2024    HGB 14.0 09/03/2024    HCT  42.6 09/03/2024    MCV 87.3 09/03/2024    MCH 28.7 09/03/2024    MCHC 32.9 09/03/2024    RDW 13.1 09/03/2024    .0 09/03/2024     Lab Results   Component Value Date     (H) 09/03/2024     No results found for: \"EAG\", \"A1C\"  Lab Results   Component Value Date    CHOLEST 187 09/03/2024    TRIG 107 09/03/2024    HDL 46 09/03/2024     (H) 09/03/2024    VLDL 19 09/03/2024    NONHDLC 141 (H) 09/03/2024     Lab Results   Component Value Date    TSH 2.977 09/03/2024     No results found for: \"B12\", \"VITB12\"  No results found for: \"VITD\", \"QVITD\", \"[id number]\"    Medications Ordered Prior to Encounter[1]    ASSESSMENT  Initial Weight Data and Goal Weight Loss:       Diagnoses and all orders for this visit:    Obesity (BMI 30.0-34.9)    Therapeutic drug monitoring    Dyslipidemia    IFG (impaired fasting glucose)        PLAN     Body mass index is 33.97 kg/m².  Medication use and side effects reviewed with patient.  Medication contraindications: ***  Follow up with dietitian and psychologist as recommended.  Discussed the role of sleep and stress in weight management.  Labs orders as above.  Counseled on comprehensive weight loss plan including attention to nutrition, exercise and behavior/stress management for success. See patient instruction below for more details.  Weight Loss Consent to treat reviewed and signed.    There are no Patient Instructions on file for this visit.    No follow-ups on file.    Patient verbalizes understanding.    Jyotsna Stokes MD           [1]   No current outpatient medications on file prior to visit.     No current facility-administered medications on file prior to visit.

## 2024-10-29 NOTE — PROGRESS NOTES
HISTORY OF PRESENT ILLNESS  Chief Complaint   Patient presents with    Weight Problem     Self ref, no prior WL management,possible open to meds       Va Woods is a 43 year old female new to our office today for initiation of medical weight loss program.  Patient presents today with c/o excess weight.     2 boys , therapy dog that she takes every where and also works as professor.   Had a great metabolism growing up. Slowed down and tried WW int he psat.   Felt that she did well during first pregnacy and second pregnancy had gestational diabetes  Was able to maintain weight while pregnant.       Reason/goal for weight loss: goal 160-180     Previous weight loss efforts in the past/medication: diet and exercise     Interest in Bariatric surgery: none     Barriers to weight loss: n/a     Wt Readings from Last 6 Encounters:   10/29/24 230 lb (104.3 kg)   08/26/24 233 lb (105.7 kg)   01/03/23 220 lb (99.8 kg)   03/11/22 230 lb (104.3 kg)   12/30/18 220 lb (99.8 kg)   12/27/18 220 lb (99.8 kg)              [facility] Initial Intake    General Information  Patient stated 6 month goal: To feel better and be healthier   Patient stated 1 year goal: To feel better and be healthier. A “normal”   BMI range would be ideal.   Patient stated readiness to make a Lifestyle Change (0 = no desire; 10 =   extremely motivated): 8 Patient stated willingness to take medication(s)   for weight loss (0 = no interest; 10 = ready to start): 7 Patient stated   level of interest in bariatric surgery (0 = not interested; 10 = very   interested): 1   Patient agreement to achieve  weight loss: Show up for scheduled follow-up   appointments, timely complete tests as discussed and ordered (labs,   cardiac testing, sleep study and/or imaging), work toward goal of   exercising 30 minutes 5 days, 150 minutes/week, make an appointment with   Eric Elizabeth dietician to assist in making dietary changes, take   medication as prescribed and contact clinic  if any questions or concerns   regarding medication, reach the goal of losing 5-10% of total body weight   within 6 months of consultation, ask for clarification, help or support   when needed and give myself credit for my accomplishments and patience   during this process.: I agree  Previous Weight Loss  Referral source to [facility]: Intranet/Web Search  Patient stated previous weight loss history: No medications. Had success   on a low glycemic plan in 2017 just didnt stick with it long term  Patient stated eating behaviors/patterns that have been barriers to weight   loss success in the past: Fast food, Pepsi, emotional or boredom eating,   junk food out of convenience and habit  24 Hour Food Journal  Breakfast: This is my worst time of day when i find myself grabbing fast   food breakfast, especially on days when i know i wont hahe time to stop   for lunch. Lunch: Pack lunch for work, often a sandwhich or salad. Lunch   generally tends to be my healthiest meal of the day. I dont always have   time to eat at work.   Dinner: Home cooked meals (wide nutritional range and portion sizes way   too big), eat out about twice per week Snacks: Fruit, veg, nuts, and too   much processed junk (chips, ice cream, popcorn, fast food)   Fluids: Mostly water but i do love milk and i drink 1-2 pepsis per day   (have switched mostly to sugar free but still consume regular)    Lifestyle   Patient stated use of tobacco: No Patient stated # of alcoholic beverages   per week: 1   Patient stated supplements taken on a regular basis include: None   Exercise   Patient stated exercises # days/week: 2 Patient stated perceived level of   exertion: 3 Patient stated average level of stress: 7   Patient stated activities: Walking, yard work   Patient stated coping strategies: EATING!   Sleep   Patient stated # hours uninterrupted sleep: 8 Patient stated # times   awakened: 2 Patient stated feels restful: No   Patient stated  snores: No Patient stated has sleep apnea: No Patient   stated uses sleep device: None                Breakfast Lunch Dinner Snacks Fluids   Reviewed         Social hx and lifestyle reviewed:    Work: [employer]   Marital status: yes   Support: good   Tobacco use: neg  ETOH use: 1/ week   Supplements: none   Exercise: 7 / exercise 2 days per week   Stress level: 7/10  Sleep hours and integrity: 8 ours in average     Goal 2 per day     MEDICAL HISTORY  PMH reviewed:   Cardiac disorders:negative    Depression/anxiety: anxiety in the past   Glaucoma: negative   Kidney stones: negative   Eating disorder: negative   Migraines: negative   Seizures: negative   Joint-related conditions:negative   Liver disease: negative   Renal disease: negative   Diabetes: negative  Thyroid disease: negative   Constipation: negative  DVT: negative    Family or personal history of Pancreatic issues / Medullary Thyroid Cancer: negative    History of bariatric surgery: negative     FMH reviewed: obesity in parent/s or sibling: YES     REVIEW OF SYSTEMS  GENERAL: feels well otherwise,   NECK: denies thickening   LUNGS: denies shortness of breath with exertion, no apnea   CARDIOVASCULAR: denies chest pain on exertion , denies palpitations or pedal edema  GI: denies abdominal pain.  No N/V/D/C  MUSCULOSKELETAL: denies joint pains   NEURO: denies headaches   PSYCH: denies change in behavior or mood, denies feeling sad or depressed     EXAM  /74   Pulse 84   Resp 18   Ht 5' 9\" (1.753 m)   Wt 230 lb (104.3 kg)   LMP 08/01/2024   SpO2 99%   BMI 33.97 kg/m² ,              GENERAL: well developed, well nourished, in no apparent distress  SKIN: warm, pink, dry without rashes to exposed area   EYES: conjunctiva pink, sclera non icteric, PERRL  HEENT: atraumatic, normocephalic, O/p: Mallampati score- 2  NECK: supple, non tender, no adenopathy, no thyromegaly,   LUNGS: CTA in all fields, breathing non labored  CARDIO: RRR without murmur,  normal S1 and S2 without clicks or gallops, no pedal edema.GI: rotund, no masses, HSM or tenderness  MUSCULOSKELETAL: grossly intact   NEURO: Oriented times three, full ROM of bilateral UE/LE  PSYCH: pleasant, cooperative, normal mood and affect,     Lab Results   Component Value Date    WBC 5.3 09/03/2024    RBC 4.88 09/03/2024    HGB 14.0 09/03/2024    HCT 42.6 09/03/2024    MCV 87.3 09/03/2024    MCH 28.7 09/03/2024    MCHC 32.9 09/03/2024    RDW 13.1 09/03/2024    .0 09/03/2024     Lab Results   Component Value Date     (H) 09/03/2024     No results found for: \"EAG\", \"A1C\"  Lab Results   Component Value Date    CHOLEST 187 09/03/2024    TRIG 107 09/03/2024    HDL 46 09/03/2024     (H) 09/03/2024    VLDL 19 09/03/2024    NONHDLC 141 (H) 09/03/2024     Lab Results   Component Value Date    TSH 2.977 09/03/2024     No results found for: \"B12\", \"VITB12\"  No results found for: \"VITD\", \"QVITD\", \"[id number]\"    Medications Ordered Prior to Encounter[1]    ASSESSMENT  Initial Weight Data and Goal Weight Loss:  Weight Calculations  Initial Weight: 230 lbs  Initial Weight Date: 10/29/24  Today's Weight: 230 lbs  5% Goal: 11.5  10% Goal: 23  Total Weight Loss: 0 lbs    Diagnoses and all orders for this visit:    Obesity (BMI 30.0-34.9)  -     Hemoglobin A1C; Future  -     Vitamin D; Future  -     B12 AND FOLATE; Future  -     [facility] Nutrition Therapy DIETITIAN - [place] [facility]    Therapeutic drug monitoring  -     Hemoglobin A1C; Future  -     Vitamin D; Future  -     B12 AND FOLATE; Future  -     [facility] Nutrition Therapy DIETITIAN - [place] [facility]    Dyslipidemia  -     Hemoglobin A1C; Future  -     Vitamin D; Future  -     B12 AND FOLATE; Future  -     [facility] Nutrition Therapy DIETITIAN - [place] [facility]    IFG (impaired fasting glucose)  -     Hemoglobin A1C; Future  -     Vitamin D; Future  -      B12 AND FOLATE; Future  -     [facility] Nutrition Therapy DIETITIAN - [place] [facility]    History of gestational diabetes  -     Hemoglobin A1C; Future  -     Vitamin D; Future  -     B12 AND FOLATE; Future  -     [facility] Nutrition Therapy DIETITIAN - [place] [facility]        PLAN  230 lb (104.3 kg)  Body mass index is 33.97 kg/m².  Medication use and side effects reviewed with patient.  Medication contraindications: n/a   Check labs for 2/2 causes of weight gain   Consider treatment options of contrave, qsymia, wegovy, zepbound   -advised of side effects and adverse effects of this medication  Reviewed each drug option / pro/cons and side effects.   Referral to dietitian   Patient advised to review for side effects.   Follow up with dietitian and psychologist as recommended.  Discussed the role of sleep and stress in weight management.  Labs orders as above.  Counseled on comprehensive weight loss plan including attention to nutrition, exercise and behavior/stress management for success. See patient instruction below for more details.  Weight Loss Consent to treat reviewed and signed.    Patient Instructions         No follow-ups on file.    Patient verbalizes understanding.    Jyotsna Stokes MD           [1]   No current outpatient medications on file prior to visit.     No current facility-administered medications on file prior to visit.

## 2024-10-30 ENCOUNTER — PATIENT MESSAGE (OUTPATIENT)
Dept: INTERNAL MEDICINE CLINIC | Facility: CLINIC | Age: 44
End: 2024-10-30

## 2024-10-30 DIAGNOSIS — R73.01 IFG (IMPAIRED FASTING GLUCOSE): ICD-10-CM

## 2024-10-30 DIAGNOSIS — Z51.81 THERAPEUTIC DRUG MONITORING: Primary | ICD-10-CM

## 2024-10-30 DIAGNOSIS — E66.811 OBESITY (BMI 30.0-34.9): ICD-10-CM

## 2024-10-31 DIAGNOSIS — E55.9 VITAMIN D DEFICIENCY: Primary | ICD-10-CM

## 2024-10-31 RX ORDER — ERGOCALCIFEROL 1.25 MG/1
50000 CAPSULE, LIQUID FILLED ORAL WEEKLY
Qty: 12 CAPSULE | Refills: 0 | Status: SHIPPED | OUTPATIENT
Start: 2024-10-31

## 2024-10-31 NOTE — TELEPHONE ENCOUNTER
Great lets  Start with phentermine which is generic for qsymia  Check bseline ecg ordered  Once results will send in rxx

## 2024-11-05 ENCOUNTER — EKG ENCOUNTER (OUTPATIENT)
Dept: LAB | Age: 44
End: 2024-11-05
Attending: INTERNAL MEDICINE
Payer: COMMERCIAL

## 2024-11-05 ENCOUNTER — OFFICE VISIT (OUTPATIENT)
Dept: INTERNAL MEDICINE CLINIC | Facility: CLINIC | Age: 44
End: 2024-11-05
Payer: COMMERCIAL

## 2024-11-05 DIAGNOSIS — E66.811 OBESITY (BMI 30.0-34.9): Primary | ICD-10-CM

## 2024-11-05 DIAGNOSIS — Z51.81 THERAPEUTIC DRUG MONITORING: ICD-10-CM

## 2024-11-05 DIAGNOSIS — E78.5 DYSLIPIDEMIA: ICD-10-CM

## 2024-11-05 DIAGNOSIS — E66.811 OBESITY (BMI 30.0-34.9): ICD-10-CM

## 2024-11-05 DIAGNOSIS — R73.01 IFG (IMPAIRED FASTING GLUCOSE): ICD-10-CM

## 2024-11-05 DIAGNOSIS — Z86.32 HISTORY OF GESTATIONAL DIABETES: ICD-10-CM

## 2024-11-05 LAB
ATRIAL RATE: 73 BPM
P AXIS: 36 DEGREES
P-R INTERVAL: 176 MS
Q-T INTERVAL: 384 MS
QRS DURATION: 78 MS
QTC CALCULATION (BEZET): 423 MS
R AXIS: 22 DEGREES
T AXIS: 20 DEGREES
VENTRICULAR RATE: 73 BPM

## 2024-11-05 PROCEDURE — 93005 ELECTROCARDIOGRAM TRACING: CPT

## 2024-11-05 PROCEDURE — 93010 ELECTROCARDIOGRAM REPORT: CPT | Performed by: INTERNAL MEDICINE

## 2024-11-05 PROCEDURE — 97802 MEDICAL NUTRITION INDIV IN: CPT | Performed by: DIETITIAN, REGISTERED

## 2024-11-06 ENCOUNTER — NURSE ONLY (OUTPATIENT)
Dept: HEMATOLOGY/ONCOLOGY | Age: 44
End: 2024-11-06
Attending: GENETIC COUNSELOR, MS
Payer: COMMERCIAL

## 2024-11-06 ENCOUNTER — GENETICS ENCOUNTER (OUTPATIENT)
Dept: GENETICS | Age: 44
End: 2024-11-06
Attending: GENETIC COUNSELOR, MS
Payer: COMMERCIAL

## 2024-11-06 DIAGNOSIS — Z80.41 FH: OVARIAN CANCER: Primary | ICD-10-CM

## 2024-11-06 PROCEDURE — 96040 HC GENETIC COUNSELING EA 30 MIN: CPT | Performed by: GENETIC COUNSELOR, MS

## 2024-11-06 PROCEDURE — 36415 COLL VENOUS BLD VENIPUNCTURE: CPT

## 2024-11-06 NOTE — PROGRESS NOTES
Referring Provider:  Manjinder Galloway MD    Reason for Referral:  Va Woods was referred for genetic counseling because of a family history of ovarian cancer. Ms. Woods is a 43 year-old woman of English descent who has no personal history of cancer. Ms. Sanchez uterus and ovaries are intact. Ms. Sanchez last mammogram was on 24. Ms. Sanchez has not had a colonoscopy.    Social History:  Ms. Woods was seen today by herself. Ms. Sanchez lives in [place].    Family History:   A three generation pedigree was obtained.      Ms. Woods has two sons, ages seven and 14.    Ms. Woods has two older brothers and no sisters.      Ms. Sanchez mother is 71 years-old and has not had cancer. Ms. Sanchez mother had a hysterectomy and unilateral oophorectomy in her early 30s because of uterine fibroids; she reportedly has one ovary intact. Ms. Sanchez mother has two younger sisters and no brothers. Ms. Sanchez maternal grandmother  at age 70 from ovarian cancer. Ms. Sanchez grandmother's mother and sister  from ovarian cancer. Ms. Sanchez maternal grandfather  in his late 60s and did not have cancer.     Ms. Sanchez father is 71 years-old and had prostate cancer in his mid-50s or early 60s. Ms. Sanchez father has one older brother and one younger sister. Ms. Sanchez paternal uncle had prostate cancer in his 70s. Ms. Sanchez paternal grandmother  in her early 90s and did not have cancer. Two of Ms. Sanchez grandmother's sisters and one of her nieces had breast cancer. Ms. Sanchez paternal grandfather had prostate cancer but  from non-cancer-related causes in his 80s.     Please see the pedigree for additional family history information.     Counseling:   The following information was discussed with Ms. Woods.    Genetics of Cancer:  The majority of cancers are sporadic whereas approximately 10-30% of cases of cancer cases are attributed to familial factors such as unidentified low penetrance genes in the family or shared  environmental factors.  Approximately 5-10% of cancers are related to a hereditary cancer syndrome.  Signs of a hereditary cancer syndrome include some rare cancers, common cancers occurring at unusually young ages, multiple primary cancers in the some individual, or the same type of cancer or related cancers (e.g., breast and ovarian, colorectal and endometrial) in three or more individuals in the same lineage.      Risk Assessment:   Ms. Woods meets NCCN Guidelines testing criteria for ovarian cancer susceptibility genes based on her maternal grandmother's history of ovarian cancer. Ms. Woods asked her mother to undergo germline testing approximately five years ago but her mother declined to do so.    Genetic Testing (Panel):  The pros, cons, and limitations of genetic testing were discussed including the potential implications of test results on clinical management.     If a pathogenic variant is not identified (negative result), it is still possible that Ms. Woods has a pathogenic variant in one of these genes that was not detected by the genetic test, or that the family is dealing with a hereditary cancer syndrome involving a different gene. It is also possible that Ms. Woods's relatives have a pathogenic variant in one of these genes that Ms. Woods did not inherit. In this scenario, options for cancer screening/management should be determined according to personal and family histories and should be discussed with a physician.      A variant of uncertain significance is a DNA change that may or may not alter the function of the gene; therefore, it is usually not possible to determine if the gene variant is responsible for an individual's increased cancer risk.     If Ms. Woods is found to carry a pathogenic variant in a cancer predisposition gene, she is at significantly increased risk for various cancers. The magnitude of these risks, and the cancers for which she is at increased risk would depend on the gene  involved. Medical recommendations for individuals with BRCA1/2 pathogenic variants were reviewed as an example. It was also explained that for some of the genes for which testing is available, the associated cancer risks have yet to be determined and medical management recommendations may not yet be available for individuals with pathogenic variants in these genes. If she were to test positive for a pathogenic variant, her children and siblings would each have a 50% chance of carrying the same variant. At-risk adults (>18) would have the option of pursuing targeted genetic testing to clarify their cancer risks. Genetic test results have implications for the entire biological family. Thus, it is recommended that she share her genetic test results with her biological family members so that they may have their risk assessed.     Genetic Information Non-Discrimination Act:  The legal protections of the Genetic Information Nondiscrimination Act (GREGORIO) for health insurance and employment were discussed.  GREG[place]IO does not provide protection for life insurance, disability or long-term care insurance.    Summary and Plan:  Ms. Woods was referred for genetic counseling because of a family history of ovarian cancer. Her reported family history is suspicious for a hereditary cancer syndrome. Genetic testing on Ms. Woods for BRCA1/2 pathogenic variants as part of a multigene panel is indicated.      At the conclusion of the counseling session Ms. Woods decided to proceed with genetic testing. Written consent was obtained. [facility] and [facility] to [facility] for their Breast/Gyn Guidelines panel. I anticipate that Ms. Woods' results will be available within 2-3 weeks and will call her with the results.  Results will also be communicated to Dr. Galloway.    Approximately 40 minutes was spent in consultation with Ms. Woods.

## 2024-11-06 NOTE — PROGRESS NOTES
INITIAL OUTPATIENT NUTRITION CONSULTATION    Nutrition Assessment    Nutrition related diagnoses: Obesity and hx gestational diabetes    Client Age and Gender: 43 year old female    Pertinent social hx: with 2 sons 14 and 6 yo.  Employed as a professor      Labs:   HgbA1C   Date Value Ref Range Status   10/29/2024 5.5 <5.7 % Final     Comment:      Normal HbA1C:     <5.7%   Pre-Diabetic:     5.7 - 6.4%   Diabetic:         >6.4%   Diabetic Control: <7.0%         Triglycerides   Date Value Ref Range Status   09/03/2024 107 30 - 149 mg/dL Final     Comment:     Reference interval for fasting triglycerides  Desirable: <150 mg/dL  Borderline: 150-199 mg/dL  High: 200-499 mg/dL  Very High: >=500 mg/dL           LDL Cholesterol   Date Value Ref Range Status   09/03/2024 122 (H) <100 mg/dL Final     Comment:     Optimal            <100 mg/dL   Near/Above OptimaL 100-129 mg/dL   Borderline High    130-159 mg/dL   High               160-189 mg/dL    Very High          >190 mg/dL          HDL Cholesterol   Date Value Ref Range Status   09/03/2024 46 40 - 59 mg/dL Final     Comment:     Interpretive Information:   An HDL cholesterol <40 mg/dL is low and constitutes a coronary heart disease risk factor. An HDL cholesterol >60 mg/dL is a negative risk factor for coronary heart disease.         No results found for: \"AST\"  No results found for: \"ALT\"      Height:  Ht Readings from Last 1 Encounters:   10/29/24 5' 9\" (1.753 m)       Weight:   Wt Readings from Last 2 Encounters:   10/29/24 230 lb (104.3 kg)   08/26/24 233 lb (105.7 kg)       BMI Readings from Last 1 Encounters:   10/29/24 33.97 kg/m²     Diet/Weight History: Max weight currently.  20 lb weight gain in the past 7 years.  No weight challenges as a child or young adult    Current weight loss medication: Qsymia prescribed pending ECG results.    Current Diet: Erratic meal schedule with meal skipping.  Excess snacking on convenience foods.  Fast food for  breakfast several times weekly.  Dinner is typically prepared at home and balanced.  Portions larger than recommended. Pt craves chocolate.  Beverages are contributing to weight challenges.    Food/Beverage Intake: oral recall  Breakfast: Fast food such as Karma biscuit sandwich or skips  Lunch: Packs a sandwich or salad to take to work but frequently does not eat it due to lack of time.  Snacks on convenience foods when lunch is skipped.  Dinner: Steak, potato, broccoli  Snacks: Chips, ice cream, popcorn, fast food, chocolates, desserts  Beverages: Pepsi, diet Pepsi, milk, inadequate water    Meal pattern: 1-3 meals/d, frequent snacks/d    Number of meals/week eaten at restaurants: 3-4+        Alcohol Intake: 0-1 drinks/wk, once monthly    Estimated current caloric intake: 2373 cals/d    Estimated caloric needs for weight loss: 1875 cals/d for 1 pounds/week weight loss    Recommended macronutrient targets:  30  percent of calories from protein=140 gms/d  35  percent of calories from carbohydrate= 164 gms/d  35  percent of calories from fat= 73gms/d    Recommended fiber intake: 25 gms/day or more    Physical Activity: 0-2 hrs/week hiking with family inconsistently.      Food Journal: not currently.  Hx of Carb manager julisa when dx'd with gestational diabetes    Spent 60 minutes in consultation with the patient.      Nutrition Intervention/Education:  Comprehensive nutrition education and evaluation provided for weight loss. Pt had been successful with weight and blood glucose mgmt during second pregnancy after dx of gest. DM.  No longer has nutritional info from that time and pt requested info on calorie and macro goals which were provided.  Pt plans on resuming tracking on julisa.  Discussed importance of structured meals and quick meal ideas were provided.  Healthier options to deal with chocolate cravings were recommended.   Patient agreed to goals below.    Goals:   Prioritize self care/nutrition  Eat 3 meals daily  and if hungry between meals add a snack  Track food on julisa  Allow celebratory foods as part of sustainable approach to managing weight      Monitoring/Evaluation:  Patient scheduled [facility]. Follow up as needed.          Eric Elizabeth MS, RD, LDN

## 2024-11-18 ENCOUNTER — GENETICS ENCOUNTER (OUTPATIENT)
Dept: HEMATOLOGY/ONCOLOGY | Facility: HOSPITAL | Age: 44
End: 2024-11-18

## 2024-11-18 PROBLEM — Z13.71 BRCA NEGATIVE: Status: ACTIVE | Noted: 2024-11-18

## 2024-11-18 NOTE — PROGRESS NOTES
Referring Provider:                    Manjinder Galloway MD/Angel Babin MD    Reason for Referral:  Va Woods had genetic testing performed on 11/06/24 because of a family history of cancer.     Genetic Testing Result:  NEGATIVE - No known pathogenic variants were found in the following 19 genes: MIKAELA, BARD1, BRCA1, BRCA2, BRIP1, CDH1, CHEK2, MLH1, MSH2 (including EPCAM rearrangement testing), MSH6, NF1, PALB2, PMS2, PTEN, RAD51C, RAD51D, STK11, and TP53.  Please refer to the report from [facility] ([id number]) for additional testing information. These results were discussed with Ms. Woods by phone on 11/18/24.    Summary and Plan:  These results indicate that it is unlikely that Ms. Woods has a pathogenic variant in any of the genes listed above. The limitations of the testing include the chance that a pathogenic variant in a gene other than those included in this analysis might be the cause of cancer in Ms. Woods' relatives. I encourage Ms. Woods to contact me on an annual basis to see if there have been any updates in genetic testing that would apply to her or to inform me if there are any changes to the family history.    In the meantime, Ms. Woods and her relatives should speak with their physicians to discuss recommended medical management according to their personal and family history. Ms. Woods' estimated lifetime risk for breast cancer remains elevated over that of the general population based on her family history.  Ms. Sanchez lifetime risk for breast cancer is estimated to be 8.1% based on Tyrer-Cuzick model, version 8.       Cc:  Va Woods

## 2024-12-03 ENCOUNTER — OFFICE VISIT (OUTPATIENT)
Dept: INTERNAL MEDICINE CLINIC | Facility: CLINIC | Age: 44
End: 2024-12-03
Payer: COMMERCIAL

## 2024-12-03 VITALS
BODY MASS INDEX: 33.18 KG/M2 | HEIGHT: 69 IN | DIASTOLIC BLOOD PRESSURE: 74 MMHG | HEART RATE: 89 BPM | WEIGHT: 224 LBS | SYSTOLIC BLOOD PRESSURE: 128 MMHG | RESPIRATION RATE: 20 BRPM

## 2024-12-03 DIAGNOSIS — K59.04 CHRONIC IDIOPATHIC CONSTIPATION: ICD-10-CM

## 2024-12-03 DIAGNOSIS — Z51.81 THERAPEUTIC DRUG MONITORING: Primary | ICD-10-CM

## 2024-12-03 DIAGNOSIS — Z86.32 HISTORY OF GESTATIONAL DIABETES: ICD-10-CM

## 2024-12-03 DIAGNOSIS — E66.811 OBESITY (BMI 30.0-34.9): ICD-10-CM

## 2024-12-03 DIAGNOSIS — E78.5 DYSLIPIDEMIA: ICD-10-CM

## 2024-12-03 DIAGNOSIS — E55.9 VITAMIN D DEFICIENCY: ICD-10-CM

## 2024-12-03 PROCEDURE — 3078F DIAST BP <80 MM HG: CPT | Performed by: INTERNAL MEDICINE

## 2024-12-03 PROCEDURE — 99214 OFFICE O/P EST MOD 30 MIN: CPT | Performed by: INTERNAL MEDICINE

## 2024-12-03 PROCEDURE — 3074F SYST BP LT 130 MM HG: CPT | Performed by: INTERNAL MEDICINE

## 2024-12-03 PROCEDURE — 3008F BODY MASS INDEX DOCD: CPT | Performed by: INTERNAL MEDICINE

## 2024-12-03 RX ORDER — PHENTERMINE HYDROCHLORIDE 15 MG/1
15 CAPSULE ORAL EVERY MORNING
Qty: 30 CAPSULE | Refills: 2 | Status: SHIPPED | OUTPATIENT
Start: 2024-12-03

## 2024-12-03 RX ORDER — LINACLOTIDE 145 UG/1
145 CAPSULE, GELATIN COATED ORAL WEEKLY
Qty: 30 CAPSULE | Refills: 0 | Status: CANCELLED | COMMUNITY
Start: 2024-12-03 | End: 2025-01-02

## 2024-12-03 NOTE — PROGRESS NOTES
HISTORY OF PRESENT ILLNESS  Chief Complaint   Patient presents with    Weight Check     Down 6        Va Woods is a 44 year old female here for follow up in medical weight loss program.     Denies chest pain, shortness of breath, dizziness, blurred vision, headache, paresthesia, nausea/vomiting.     Started medication on Nov 11     Home scale 235 lb     Currently at 224    Reviewed nutrition / average calorie amount  Average 8240-1916 calories per day   75 carb  100 protein   60 fat   Started to notice appetite suppression   Struggling with constipation   Having bowel movements every 3-4 days     Started on colace / laxative   Fiber 2-3 per day     Wt Readings from Last 6 Encounters:   12/03/24 224 lb (101.6 kg)   10/29/24 230 lb (104.3 kg)   08/26/24 233 lb (105.7 kg)   01/03/23 220 lb (99.8 kg)   03/11/22 230 lb (104.3 kg)   12/30/18 220 lb (99.8 kg)          [facility] Follow Up    General Information  Success Moment: .  Challenging Moment: .  Nutrition Recall  Breakfast: I will show you my julisa during appt    Exercise   Patient stated average level of stress: 5  Sleep   Patient stated # hours uninterrupted sleep: 8   Patient stated feels   restful: Yes      Goals: Weight loss              Breakfast Lunch Dinner Snacks Fluids              REVIEW OF SYSTEMS  GENERAL HEALTH: feels well otherwise, denied any fevers chills or night sweats   RESPIRATORY: denies shortness of breath   CARDIOVASCULAR: denies chest pain  GI: denies abdominal pain    EXAM  /74   Pulse 89   Resp 20   Ht 5' 9\" (1.753 m)   Wt 224 lb (101.6 kg)   LMP 11/18/2024 (Exact Date)   BMI 33.08 kg/m²   GENERAL: well developed, well nourished,in no apparent distress, A/O x3  SKIN: no rashes,no suspicious lesions  HEENT: atraumatic, normocephalic, OP-clear, PERRL  NECK: supple,no adenopathy  LUNGS: clear to auscultation bilaterally   CARDIO: RRR without murmur  GI: good BS's,NT/ND, no masses or HSM  EXTREMITIES: no cyanosis, no clubbing, no  edema    Lab Results   Component Value Date    WBC 5.3 09/03/2024    RBC 4.88 09/03/2024    HGB 14.0 09/03/2024    HCT 42.6 09/03/2024    MCV 87.3 09/03/2024    MCH 28.7 09/03/2024    MCHC 32.9 09/03/2024    RDW 13.1 09/03/2024    .0 09/03/2024     Lab Results   Component Value Date     (H) 09/03/2024     Lab Results   Component Value Date     10/29/2024    A1C 5.5 10/29/2024     Lab Results   Component Value Date    CHOLEST 187 09/03/2024    TRIG 107 09/03/2024    HDL 46 09/03/2024     (H) 09/03/2024    VLDL 19 09/03/2024    NONHDLC 141 (H) 09/03/2024     Lab Results   Component Value Date    TSH 2.977 09/03/2024     Lab Results   Component Value Date    B12 536 10/29/2024     Lab Results   Component Value Date    VITD 27.5 (L) 10/29/2024       Medications Ordered Prior to Encounter[1]    ASSESSMENT  Analyzed weight data:       Diagnoses and all orders for this visit:    Therapeutic drug monitoring  -     Phentermine HCl 15 MG Oral Cap; Take 1 capsule (15 mg total) by mouth every morning.    Obesity (BMI 30.0-34.9)  -     Phentermine HCl 15 MG Oral Cap; Take 1 capsule (15 mg total) by mouth every morning.    History of gestational diabetes  -     Phentermine HCl 15 MG Oral Cap; Take 1 capsule (15 mg total) by mouth every morning.    Vitamin D deficiency  -     Phentermine HCl 15 MG Oral Cap; Take 1 capsule (15 mg total) by mouth every morning.    Dyslipidemia  -     Phentermine HCl 15 MG Oral Cap; Take 1 capsule (15 mg total) by mouth every morning.    Chronic idiopathic constipation    Other orders  -     linaCLOtide (LINZESS) 72 MCG Oral Cap; Take 72 mcg by mouth once a week.        PLAN   10/29/24: 230   Down 6 lb   Total time spent on chart review, pre-charting, obtaining history, counseling, and educating, reviewing labs was 30 minutes.  Reviewed labwork   Continue phentermine   -advised of side effects and adverse effects of this medication  Add linzess prn constipation  severe  -advised of side effects and adverse effects of this medication  Reviewed home bowel regimen     Nutrition: low carb diet/ recommended to eat breakfast daily/ regular protein intake  Medication use and side effects reviewed with patient.  Medication contraindications: n/a  Follow up with dietitian and psychologist as recommended.  Discussed the role of sleep and stress in weight management.  Counseled on comprehensive weight loss plan including attention to nutrition, exercise and behavior/stress management for success. See patient instruction below for more details.  Discussed strategies to overcome barriers to successful weight loss and weight maintenance  FITTE: ACSM recommendations (150-300 minutes/ week in active weight loss)   Weight Loss consent to treat reviewed and signed    Patient Instructions         Return in about 8 weeks (around 1/28/2025).    Patient verbalizes understanding.    Jyotsna Stokes MD           [1]   Current Outpatient Medications on File Prior to Visit   Medication Sig Dispense Refill    ergocalciferol 1.25 MG (95309 UT) Oral Cap Take 1 capsule (50,000 Units total) by mouth once a week. With food for 12 weeks total then begin OTC Vitamin D at 2000 units daily with food thereafter 12 capsule 0     No current facility-administered medications on file prior to visit.

## 2024-12-05 RX ORDER — LINACLOTIDE 72 UG/1
72 CAPSULE, GELATIN COATED ORAL WEEKLY
Qty: 30 CAPSULE | Refills: 0 | COMMUNITY
Start: 2024-12-05 | End: 2025-01-04

## 2024-12-17 ENCOUNTER — PATIENT MESSAGE (OUTPATIENT)
Dept: INTERNAL MEDICINE CLINIC | Facility: CLINIC | Age: 44
End: 2024-12-17

## 2025-03-18 ENCOUNTER — OFFICE VISIT (OUTPATIENT)
Dept: INTERNAL MEDICINE CLINIC | Facility: CLINIC | Age: 45
End: 2025-03-18
Payer: COMMERCIAL

## 2025-03-18 VITALS
RESPIRATION RATE: 22 BRPM | BODY MASS INDEX: 28.88 KG/M2 | HEIGHT: 69 IN | HEART RATE: 87 BPM | DIASTOLIC BLOOD PRESSURE: 78 MMHG | SYSTOLIC BLOOD PRESSURE: 118 MMHG | WEIGHT: 195 LBS

## 2025-03-18 DIAGNOSIS — E78.5 DYSLIPIDEMIA: ICD-10-CM

## 2025-03-18 DIAGNOSIS — E66.811 OBESITY (BMI 30.0-34.9): ICD-10-CM

## 2025-03-18 DIAGNOSIS — Z86.32 HISTORY OF GESTATIONAL DIABETES: ICD-10-CM

## 2025-03-18 DIAGNOSIS — Z51.81 THERAPEUTIC DRUG MONITORING: Primary | ICD-10-CM

## 2025-03-18 DIAGNOSIS — E55.9 VITAMIN D DEFICIENCY: ICD-10-CM

## 2025-03-18 PROCEDURE — 3008F BODY MASS INDEX DOCD: CPT | Performed by: INTERNAL MEDICINE

## 2025-03-18 PROCEDURE — 3078F DIAST BP <80 MM HG: CPT | Performed by: INTERNAL MEDICINE

## 2025-03-18 PROCEDURE — 3074F SYST BP LT 130 MM HG: CPT | Performed by: INTERNAL MEDICINE

## 2025-03-18 PROCEDURE — 99213 OFFICE O/P EST LOW 20 MIN: CPT | Performed by: INTERNAL MEDICINE

## 2025-03-18 RX ORDER — PHENTERMINE HYDROCHLORIDE 15 MG/1
15 CAPSULE ORAL EVERY MORNING
Qty: 30 CAPSULE | Refills: 3 | Status: SHIPPED | OUTPATIENT
Start: 2025-03-18 | End: 2025-03-18

## 2025-03-18 RX ORDER — PHENTERMINE HYDROCHLORIDE 15 MG/1
15 CAPSULE ORAL EVERY MORNING
Qty: 30 CAPSULE | Refills: 3 | Status: SHIPPED | OUTPATIENT
Start: 2025-03-18 | End: 2025-03-19

## 2025-03-18 NOTE — PROGRESS NOTES
HISTORY OF PRESENT ILLNESS  Chief Complaint   Patient presents with    Weight Check     Down 29        Va Woods is a 44 year old female here for follow up in medical weight loss program.     Denies chest pain, shortness of breath, dizziness, blurred vision, headache, paresthesia, nausea/vomiting.     Down 29 lb    is down 25 lb   Has been working [employer] and doing hills and 7 days a week with 30 minutes   Foods been really good   Was on vacation and did well with the weight loss   Goal 170-175 lb   Constipation was bad in severe in December  Started fiber and was able to go more regularly   Reviewed food log  Doing carb manager julisa   1400 calories per day   Keeping between 4226-5261 calories  115-120 protein per day       Wt Readings from Last 6 Encounters:   03/18/25 195 lb (88.5 kg)   12/03/24 224 lb (101.6 kg)   10/29/24 230 lb (104.3 kg)   08/26/24 233 lb (105.7 kg)   01/03/23 220 lb (99.8 kg)   03/11/22 230 lb (104.3 kg)          [facility] Follow Up    General Information  Success Moment: Weight loss  Challenging Moment: Doing great  Nutrition Recall  Breakfast: Protein shake and pc of fruit Lunch: Salad w grilled chx   Dinner: Protein, vegetables, small carb side Snacks: Veggies, fruit, nuts,   or granola bar   Fluids: Water 80-90oz per day Dining Out: 2   Exercise   Patient stated exercises # days/week: 7  Patient stated perceived level of   exertion: 3 Anaerobic Days: 3   Aerobic Days: 5   Patient stated average level of stress: 5  Sleep   Patient stated # hours uninterrupted sleep: 8   Patient stated feels   restful: Yes      Goals: Weight loss and an understanding of “end game” point and plans              Breakfast Lunch Dinner Snacks Fluids              REVIEW OF SYSTEMS  GENERAL HEALTH: feels well otherwise, denied any fevers chills or night sweats   RESPIRATORY: denies shortness of breath   CARDIOVASCULAR: denies chest pain  GI: denies abdominal pain    EXAM  /78   Pulse 87   Resp 22    Ht 5' 9\" (1.753 m)   Wt 195 lb (88.5 kg)   LMP 11/18/2024 (Exact Date)   BMI 28.80 kg/m²   GENERAL: well developed, well nourished,in no apparent distress, A/O x3  SKIN: no rashes,no suspicious lesions  HEENT: atraumatic, normocephalic, OP-clear, PERRL  NECK: supple,no adenopathy  LUNGS: clear to auscultation bilaterally   CARDIO: RRR without murmur  GI: good BS's,NT/ND, no masses or HSM  EXTREMITIES: no cyanosis, no clubbing, no edema    Lab Results   Component Value Date    WBC 5.3 09/03/2024    RBC 4.88 09/03/2024    HGB 14.0 09/03/2024    HCT 42.6 09/03/2024    MCV 87.3 09/03/2024    MCH 28.7 09/03/2024    MCHC 32.9 09/03/2024    RDW 13.1 09/03/2024    .0 09/03/2024     Lab Results   Component Value Date     (H) 09/03/2024     Lab Results   Component Value Date     10/29/2024    A1C 5.5 10/29/2024     Lab Results   Component Value Date    CHOLEST 187 09/03/2024    TRIG 107 09/03/2024    HDL 46 09/03/2024     (H) 09/03/2024    VLDL 19 09/03/2024    NONHDLC 141 (H) 09/03/2024     Lab Results   Component Value Date    TSH 2.977 09/03/2024     Lab Results   Component Value Date    B12 536 10/29/2024     Lab Results   Component Value Date    VITD 27.5 (L) 10/29/2024       Medications Ordered Prior to Encounter[1]    ASSESSMENT  Analyzed weight data:       Diagnoses and all orders for this visit:    Therapeutic drug monitoring  -     Discontinue: Phentermine HCl 15 MG Oral Cap; Take 1 capsule (15 mg total) by mouth every morning.  -     Discontinue: Phentermine HCl 15 MG Oral Cap; Take 1 capsule (15 mg total) by mouth every morning.  -     Phentermine HCl 15 MG Oral Cap; Take 1 capsule (15 mg total) by mouth 2 (two) times daily before meals.    Obesity (BMI 30.0-34.9)  -     Discontinue: Phentermine HCl 15 MG Oral Cap; Take 1 capsule (15 mg total) by mouth every morning.  -     Discontinue: Phentermine HCl 15 MG Oral Cap; Take 1 capsule (15 mg total) by mouth every morning.  -      Phentermine HCl 15 MG Oral Cap; Take 1 capsule (15 mg total) by mouth 2 (two) times daily before meals.    Dyslipidemia  -     Discontinue: Phentermine HCl 15 MG Oral Cap; Take 1 capsule (15 mg total) by mouth every morning.  -     Discontinue: Phentermine HCl 15 MG Oral Cap; Take 1 capsule (15 mg total) by mouth every morning.  -     Phentermine HCl 15 MG Oral Cap; Take 1 capsule (15 mg total) by mouth 2 (two) times daily before meals.    History of gestational diabetes  -     Discontinue: Phentermine HCl 15 MG Oral Cap; Take 1 capsule (15 mg total) by mouth every morning.  -     Discontinue: Phentermine HCl 15 MG Oral Cap; Take 1 capsule (15 mg total) by mouth every morning.  -     Phentermine HCl 15 MG Oral Cap; Take 1 capsule (15 mg total) by mouth 2 (two) times daily before meals.    Vitamin D deficiency  -     Discontinue: Phentermine HCl 15 MG Oral Cap; Take 1 capsule (15 mg total) by mouth every morning.  -     Discontinue: Phentermine HCl 15 MG Oral Cap; Take 1 capsule (15 mg total) by mouth every morning.  -     Phentermine HCl 15 MG Oral Cap; Take 1 capsule (15 mg total) by mouth 2 (two) times daily before meals.          PLAN   10/29/24: 230   Down 35 lb  Reviewed labwork   Continue phentermine , increase to 15 mg bid   -advised of side effects and adverse effects of this medication  Add linzess prn constipation severe--better  -advised of side effects and adverse effects of this medication  Reviewed home bowel regimen     Nutrition: low carb diet/ recommended to eat breakfast daily/ regular protein intake  Medication use and side effects reviewed with patient.  Medication contraindications: n/a  Follow up with dietitian and psychologist as recommended.  Discussed the role of sleep and stress in weight management.  Counseled on comprehensive weight loss plan including attention to nutrition, exercise and behavior/stress management for success. See patient instruction below for more details.  Discussed  strategies to overcome barriers to successful weight loss and weight maintenance  FITTE: ACSM recommendations (150-300 minutes/ week in active weight loss)   Weight Loss consent to treat reviewed and signed    There are no Patient Instructions on file for this visit.    No follow-ups on file.    Patient verbalizes understanding.    Jyotsna Stokes MD           [1]   Current Outpatient Medications on File Prior to Visit   Medication Sig Dispense Refill    ergocalciferol 1.25 MG (30889 UT) Oral Cap Take 1 capsule (50,000 Units total) by mouth once a week. With food for 12 weeks total then begin OTC Vitamin D at 2000 units daily with food thereafter 12 capsule 0     No current facility-administered medications on file prior to visit.

## 2025-03-19 RX ORDER — PHENTERMINE HYDROCHLORIDE 15 MG/1
15 CAPSULE ORAL
Qty: 60 CAPSULE | Refills: 2 | Status: SHIPPED | OUTPATIENT
Start: 2025-03-19

## 2025-05-10 DIAGNOSIS — E55.9 VITAMIN D DEFICIENCY: ICD-10-CM

## 2025-05-10 DIAGNOSIS — E78.5 DYSLIPIDEMIA: ICD-10-CM

## 2025-05-10 DIAGNOSIS — Z51.81 THERAPEUTIC DRUG MONITORING: ICD-10-CM

## 2025-05-10 DIAGNOSIS — E66.811 OBESITY (BMI 30.0-34.9): ICD-10-CM

## 2025-05-10 DIAGNOSIS — Z86.32 HISTORY OF GESTATIONAL DIABETES: ICD-10-CM

## 2025-05-12 ENCOUNTER — TELEPHONE (OUTPATIENT)
Dept: INTERNAL MEDICINE CLINIC | Facility: CLINIC | Age: 45
End: 2025-05-12

## 2025-05-12 NOTE — TELEPHONE ENCOUNTER
Patient lVM she was told she could increase her phentermine 15 mg to 2 daily if she hits a plateau.  She has been taking  1 tab alternating with 2 tabs every other day.    Since she used the medicine differently than ordered she cannot get any refills and she is out.  Needs new order please  I called and spoke to Marlen at [facility] - she did find the order for bid and will fill it now.    Requesting phentermine increase  LOV: 3/18/25  RTC: not noted  Last Relevant Labs: na  Filled: 3/19/25 #60 with 2 refills    Future Appointments   Date Time Provider Department Center   6/24/2025 10:20 AM Jyotsna Stokes MD [facility]   10/7/2025 10:40 AM Jyotsna Stokes MD [facility]     Continue phentermine , increase to 15 mg bid   I called patient because it appears [facility] has filled the old RX at one daily sent 3/18/25.  The new RX on 3/19/25 was adjusted.  The RXs sent 3/18/25 show to stop.  I told patient I will call the pharmacy to discuss releasing the new order/directions.  Outpatient Medication Detail     Disp Refills Start End    Phentermine HCl 15 MG Oral Cap 60 capsule 2 3/19/2025 --    Sig - Route: Take 1 capsule (15 mg total) by mouth 2 (two) times daily before meals. - Oral    Sent to pharmacy as: Phentermine HCl 15 MG Oral Capsule    E-Prescribing Status: Receipt confirmed by pharmacy (3/19/2025 10:14 AM CDT)        Pharmacy    [id number] [address] AT [facility] [address], [phone], [phone]

## 2025-05-13 RX ORDER — PHENTERMINE HYDROCHLORIDE 15 MG/1
15 CAPSULE ORAL
Qty: 60 CAPSULE | Refills: 2 | OUTPATIENT
Start: 2025-05-13

## 2025-06-24 ENCOUNTER — OFFICE VISIT (OUTPATIENT)
Dept: INTERNAL MEDICINE CLINIC | Facility: CLINIC | Age: 45
End: 2025-06-24
Payer: COMMERCIAL

## 2025-06-24 VITALS
WEIGHT: 178 LBS | BODY MASS INDEX: 26.36 KG/M2 | SYSTOLIC BLOOD PRESSURE: 118 MMHG | RESPIRATION RATE: 20 BRPM | DIASTOLIC BLOOD PRESSURE: 78 MMHG | HEART RATE: 87 BPM | HEIGHT: 69 IN

## 2025-06-24 DIAGNOSIS — E66.811 OBESITY (BMI 30.0-34.9): ICD-10-CM

## 2025-06-24 DIAGNOSIS — Z51.81 THERAPEUTIC DRUG MONITORING: Primary | ICD-10-CM

## 2025-06-24 PROCEDURE — 3008F BODY MASS INDEX DOCD: CPT | Performed by: INTERNAL MEDICINE

## 2025-06-24 PROCEDURE — 99214 OFFICE O/P EST MOD 30 MIN: CPT | Performed by: INTERNAL MEDICINE

## 2025-06-24 PROCEDURE — 3078F DIAST BP <80 MM HG: CPT | Performed by: INTERNAL MEDICINE

## 2025-06-24 PROCEDURE — 3074F SYST BP LT 130 MM HG: CPT | Performed by: INTERNAL MEDICINE

## 2025-06-24 NOTE — PROGRESS NOTES
HISTORY OF PRESENT ILLNESS  Chief Complaint   Patient presents with    Weight Check     Down 17        Va Woods is a 44 year old female here for follow up in medical weight loss program.   [facility] Follow Up    General Information  Success Moment: Went on vacation for 2 weeks and didnt gain any weight.  Challenging Moment: Weight loss has been slow since last appt.  Nutrition Recall  Breakfast: Protein shake Lunch: Salad or charcuterie   Dinner: Grilled chx, veggies, rice Snacks: Nuts, granola bar, or   fruit/veggie, ice cream sometimes   Fluids: Water and dt coke Dining Out: 3   Exercise   Patient stated exercises # days/week: 7  Patient stated perceived level of   exertion: 3 Anaerobic Days: 3   Aerobic Days: 7   Patient stated average level of stress: 4  Sleep   Patient stated # hours uninterrupted sleep: 8   Patient stated feels   restful: Yes      Goals: Lose the last 10 lbs and move into maintenance              History of Present Illness  Va Woods is a 44 year old female who presents for follow-up regarding her weight loss journey.    She is experiencing a plateau in her weight loss, which she attributes to starting strength training. Initially, her weight loss was rapid, but it has since slowed down. She is currently taking her medication at a dose of 30 mg in the morning, twice a day, without any side effects.    Her daily routine includes strength training three times a week and engaging in physical activity every day for at least 30 minutes. She enjoys a variety of exercises, including walking, running, and strength training with dumbbells. She is committed to maintaining her activity level, stating, 'I never miss a day.'    Her diet consists of a protein shake in the morning, salads, and grilled chicken for dinner. She feels confident in her eating habits and has been consistent with them for several weeks.    She is concerned about maintaining her weight loss once she stops taking medication,  expressing fear that her weight might increase again. She is focused on reaching a realistic goal weight and is considering losing an additional ten pounds to allow for some fluctuation while staying within a normal BMI range.    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    Wt Readings from Last 6 Encounters:   06/24/25 178 lb (80.7 kg)   03/18/25 195 lb (88.5 kg)   12/03/24 224 lb (101.6 kg)   10/29/24 230 lb (104.3 kg)   08/26/24 233 lb (105.7 kg)   01/03/23 220 lb (99.8 kg)              Breakfast Lunch Dinner Snacks Fluids   Reviewed           REVIEW OF SYSTEMS  GENERAL HEALTH: feels well otherwise, denied any fevers chills or night sweats   RESPIRATORY: denies shortness of breath   CARDIOVASCULAR: denies chest pain  GI: denies abdominal pain    EXAM  /78   Pulse 87   Resp 20   Ht 5' 9\" (1.753 m)   Wt 178 lb (80.7 kg)   LMP 11/18/2024 (Exact Date)   BMI 26.29 kg/m²   GENERAL: well developed, well nourished,in no apparent distress, A/O x3  SKIN: no rashes,no suspicious lesions  HEENT: atraumatic, normocephalic, OP-clear, PERRL  NECK: supple,no adenopathy  LUNGS: clear to auscultation bilaterally   CARDIO: RRR without murmur  GI: good BS's,NT/ND, no masses or HSM  EXTREMITIES: no cyanosis, no clubbing, no edema    Lab Results   Component Value Date    WBC 5.3 09/03/2024    RBC 4.88 09/03/2024    HGB 14.0 09/03/2024    HCT 42.6 09/03/2024    MCV 87.3 09/03/2024    MCH 28.7 09/03/2024    MCHC 32.9 09/03/2024    RDW 13.1 09/03/2024    .0 09/03/2024     Lab Results   Component Value Date     (H) 09/03/2024     Lab Results   Component Value Date     10/29/2024    A1C 5.5 10/29/2024     Lab Results   Component Value Date    CHOLEST 187 09/03/2024    TRIG 107 09/03/2024    HDL 46 09/03/2024     (H) 09/03/2024    VLDL 19 09/03/2024    NONHDLC 141 (H) 09/03/2024     Lab Results   Component Value Date    TSH  2.977 09/03/2024     Lab Results   Component Value Date    B12 536 10/29/2024     Lab Results   Component Value Date    VITD 27.5 (L) 10/29/2024       Medications Ordered Prior to Encounter[1]    ASSESSMENT  Analyzed weight data:  Weight Calculations  Initial Weight: 230 lbs  Initial Weight Date: 10/29/24  Today's Weight: 178 lbs  5% Goal: 11.5  10% Goal: 23  Total Weight Loss: 52 lbs  MBSAQIP Information  Patient type: Lifestyle   Initial Body Fat %: 41.1      Date of Initial Weight: 10/29/24 Initial Weight: 230     Initial BMI: 33.7         Have any comorbidities improved since the last visit?   Hypertension DM 2 Dyslipidemia Osteoarthritis Sleep Apnea                    Diagnoses and all orders for this visit:    Therapeutic drug monitoring    Obesity (BMI 30.0-34.9)        PLAN  Assessment & Plan  Obesity  Total time spent on chart review, pre-charting, obtaining history, counseling, and educating, reviewing labs was 30 minutes.  Repeat measurements   Repeat body comp  Weight loss plateau despite adherence to medication and exercise. Focus on muscle mass maintenance and sustainable weight loss. Goal: lose 10 pounds to reach normal BMI. Planned slow medication weaning to maintain weight loss.  - Continue 30 mg medication regimen in the morning.  - Perform body composition analysis today; re-evaluate in 3-6 months.  - Encourage strength training and daily physical activity.  - Discuss apps for structured strength training programs.  - Plan gradual medication reduction to 15 mg daily when appropriate.    There are no Patient Instructions on file for this visit.    No follow-ups on file.    Patient verbalizes understanding.    Jyotsna Stokes MD           [1]   Current Outpatient Medications on File Prior to Visit   Medication Sig Dispense Refill    Phentermine HCl 15 MG Oral Cap Take 1 capsule (15 mg total) by mouth 2 (two) times daily before meals. 60 capsule 2    ergocalciferol 1.25 MG (60316 UT) Oral Cap Take 1  capsule (50,000 Units total) by mouth once a week. With food for 12 weeks total then begin OTC Vitamin D at 2000 units daily with food thereafter 12 capsule 0     No current facility-administered medications on file prior to visit.

## 2025-08-21 ENCOUNTER — PATIENT MESSAGE (OUTPATIENT)
Dept: INTERNAL MEDICINE CLINIC | Facility: CLINIC | Age: 45
End: 2025-08-21

## 2025-08-21 DIAGNOSIS — E55.9 VITAMIN D DEFICIENCY: ICD-10-CM

## 2025-08-21 DIAGNOSIS — Z86.32 HISTORY OF GESTATIONAL DIABETES: ICD-10-CM

## 2025-08-21 DIAGNOSIS — E66.811 OBESITY (BMI 30.0-34.9): ICD-10-CM

## 2025-08-21 DIAGNOSIS — E78.5 DYSLIPIDEMIA: ICD-10-CM

## 2025-08-21 DIAGNOSIS — Z51.81 THERAPEUTIC DRUG MONITORING: ICD-10-CM

## 2025-08-25 ENCOUNTER — TELEPHONE (OUTPATIENT)
Dept: INTERNAL MEDICINE CLINIC | Facility: CLINIC | Age: 45
End: 2025-08-25

## 2025-08-25 RX ORDER — PHENTERMINE HYDROCHLORIDE 15 MG/1
15 CAPSULE ORAL
Qty: 60 CAPSULE | Refills: 2 | Status: SHIPPED | OUTPATIENT
Start: 2025-08-25

## (undated) NOTE — Clinical Note
IMPRESSION: IUP at 20w1d AMA: low-risk cell free fetal DNA, declined invasive testing Low-Lying Placenta  RECOMMENDATIONS: Continue care with Dr. Anup Barraza Follow-up ultrasound at 32 weeks reevaluate fetal growth and placental location.  Weekly NST's at 39 w

## (undated) NOTE — MR AVS SNAPSHOT
[facility],[address], [address] [place] [address] Rd  [phone]               Thank you for choosing us for your health care visit with Vincent Guaman RN,CDE.   We are glad to serve you and happy to provide you with - [address] [facility] [address], [phone], [address], [address] [facility]     Phone:  [phone]    - Glucose Blood Strp  - [address] [facility]                  Visit EDWARD-JUAN JOSE

## (undated) NOTE — MR AVS SNAPSHOT
[facility],[address], [address] [place] [address] Rd  [phone]               Thank you for choosing us for your health care visit with Jasson Rowan, RN,CDE.   We are glad to serve you and happy to provide you with

## (undated) NOTE — MR AVS SNAPSHOT
After Visit Summary   2017    French Lovett    MRN: [id number]           Visit Information        Provider Department Dept Phone    2017 10:00 AM  [id number]   Outpt [phone]      Your Vitals Were     BP Pulse Ht Wt BMI LMP    13 your test performed at one of the locations listed above. To initiate the precertification process, please call our scheduling department at [phone] to secure an appointment time with one of our facilities.  At that time, our department will begin to w 2 ½ hours per week – spread out over time Use a tyson to keep you motivated   Don’t forget strength training with weights and resistance Set goals and track your progress   You don’t need to join a gym. Home exercises work great.  Put more priority on exe

## (undated) NOTE — IP AVS SNAPSHOT
[facility] [place]  [address] [place], [address] ~ [phone]                Discharge Summary   5/30/2017    [facility]           Admission Information        Provider Department    5/30/2017 Kevin Abbott MD  [id number]      Why yo Preeclampsia is a serious disease related to high blood pressure (hypertension). Preeclampsia/hypertension can happen during pregnancy and up to 6 weeks following childbirth.   Contact your doctor or midwife immediately if you experience any of the follow Radiology Exams     None      Patient Education    Postpartum Education  Resolved   Safety Resolved   Review Plan of Care Resolved   Postpartum Procedures Resolved   Postpartum Medications/Pain Management Resolved   Postpartum Self Care Resolved   Postp Patient [address] to help you get signed up for insurance coverage. Patient [address] is a Federal Navigator program that can help with your Affordable Care Act coverage, as well as all types of Medicaid plans.   To get signed up and covere